# Patient Record
Sex: MALE | Race: WHITE | Employment: OTHER | URBAN - METROPOLITAN AREA
[De-identification: names, ages, dates, MRNs, and addresses within clinical notes are randomized per-mention and may not be internally consistent; named-entity substitution may affect disease eponyms.]

---

## 2019-04-03 ENCOUNTER — HOSPITAL ENCOUNTER (OUTPATIENT)
Dept: WOUND CARE | Age: 19
Discharge: HOME | End: 2019-04-03
Payer: MEDICAID

## 2019-04-03 VITALS
TEMPERATURE: 98.3 F | HEART RATE: 68 BPM | DIASTOLIC BLOOD PRESSURE: 52 MMHG | RESPIRATION RATE: 20 BRPM | SYSTOLIC BLOOD PRESSURE: 104 MMHG

## 2019-04-03 PROCEDURE — 99213 OFFICE O/P EST LOW 20 MIN: CPT

## 2019-04-03 PROCEDURE — 11042 DBRDMT SUBQ TIS 1ST 20SQCM/<: CPT

## 2019-04-03 PROCEDURE — 99204 OFFICE O/P NEW MOD 45 MIN: CPT | Performed by: SURGERY

## 2019-04-03 PROCEDURE — 11042 DBRDMT SUBQ TIS 1ST 20SQCM/<: CPT | Performed by: SURGERY

## 2019-04-03 RX ORDER — METOCLOPRAMIDE HYDROCHLORIDE 5 MG/5ML
5 SOLUTION ORAL 3 TIMES DAILY
COMMUNITY

## 2019-04-03 RX ORDER — RANITIDINE 15 MG/ML
SYRUP ORAL 2 TIMES DAILY
COMMUNITY

## 2019-04-03 RX ORDER — LOPERAMIDE HCL 1 MG/7.5ML
15 SOLUTION ORAL PRN
COMMUNITY

## 2019-04-03 RX ORDER — FLUTICASONE PROPIONATE 50 MCG
1 SPRAY, SUSPENSION (ML) NASAL PRN
COMMUNITY

## 2019-04-03 NOTE — PROGRESS NOTES
H&P / Consultation Note - [address] [place] [address] / Miguel Valladares    PCP : She Cowan DO:     Reason for Consult:  Open wound right hip    HISTORY OF PRESENT ILLNESS:    The patient is a 19 y.o. male who presents for evaluation and treatment at the [address] [place] [address]. The patient has had a wound of right hip. This has been treated with silvadene. Past history significant for bedridden, immobile. The patient reports redness and denies fever, drainage, pain. The suspected etiology of the wound is pressure. The patient has noted that the wound has not been improving. Pt has 1/10 pain associated with the wound. Pt no  hx of steroids. Pt is currently not on abx.       Vascular Studies no    ROS : All others Negative if blank [], Positive if [x]  General Urinary   [] Fevers [] Hematuria   [] Chills [] Dysuria   [] Weight Loss Vascular   Skin [] Claudication   [] Tissue Loss [] Rest Pain   Eyes Neurologic   [] Wears Glasses/Contacts [] Stroke/TIA   [] Vision Changes [] Focal weakness   Respiratory [] Slurred Speech    [] Shortness of breath ENT   Cardiovascular [] Difficulty swallowing   [] Chest Pain    [] Shortness of breath with exertion    Gastrointestinal    [] Abdominal Pain    [] Melena   [] Hematochezia         Past Medical History:   Diagnosis Date    Cerebral palsy, quadriplegic (HCC)      Past Surgical History:   Procedure Laterality Date    BACLOFEN PUMP IMPLANTATION      TENDON RELEASE       Current Medications:     Current Outpatient Medications:     silver sulfADIAZINE (SILVADENE) 1 % cream, Apply 1 Act topically every 72 hours as needed Apply three times a day and as needed to right buttocks, Disp: , Rfl:     metoclopramide (REGLAN) 5 MG/5ML solution, Take 5 mg by mouth 3 times daily, Disp: , Rfl:     ranitidine (ZANTAC) 150 MG/10ML syrup, by Per G Tube route 2 times daily, Disp: , Rfl:     Acetaminophen 160 MG/5ML SYRP, 20 mLs by PEG Tube route every 4 hours as needed for Fever For pain, headache or fever, Disp: , Rfl:     ibuprofen (ADVIL;MOTRIN) 100 MG/5ML suspension, 10 mg/kg by Per G Tube route every 4 hours as needed for Fever, Disp: , Rfl:     Loperamide HCl (CVS LOPERAMIDE HCL) 1 MG/7.5ML LIQD, Take 15 mLs by mouth as needed After second and third loose stool, Disp: , Rfl:     Magnesium Hydroxide (MILK OF MAGNESIA PO), 30 mLs by Per G Tube route as needed If no BM in 3 days, Disp: , Rfl:     bismuth subsalicylate (PEPTO BISMOL) 262 MG/15ML suspension, 15 mLs by Per G Tube route every 4 hours as needed for Indigestion, Disp: , Rfl:     fluticasone (FLONASE) 50 MCG/ACT nasal spray, 1 spray by Each Nare route as needed for Rhinitis, Disp: , Rfl:   Allergies:  Seasonal and Valium [diazepam]  Social History     Socioeconomic History    Marital status: Single     Spouse name: Not on file    Number of children: Not on file    Years of education: Not on file    Highest education level: Not on file   Occupational History    Not on file   Social Needs    Financial resource strain: Not on file    Food insecurity:     Worry: Not on file     Inability: Not on file    Transportation needs:     Medical: Not on file     Non-medical: Not on file   Tobacco Use    Smoking status: Passive Smoke Exposure - Never Smoker   Substance and Sexual Activity    Alcohol use: Not on file    Drug use: Not on file    Sexual activity: Not on file   Lifestyle    Physical activity:     Days per week: Not on file     Minutes per session: Not on file    Stress: Not on file   Relationships    Social connections:     Talks on phone: Not on file     Gets together: Not on file     Attends [employer] service: Not on file     Active member of club or organization: Not on file     Attends meetings of clubs or organizations: Not on file     Relationship status: Not on file    Intimate partner violence:     Fear of current or ex partner: Not on file     Emotionally abused: Not on file     Physically abused: Not on file Forced sexual activity: Not on file   Other Topics Concern    Not on file   Social History Narrative    Not on file     History reviewed. No pertinent family history. Objective:    BP (!) 104/52   Pulse 68   Temp 98.3 °F (36.8 °C) (Oral)   Resp 20   Wound Evaluation  - Undermining is not present  - Fibrinous exudate - Minimal amt  - Hyperkeratotic tissue - Minimal amt  - Granulation tissue - Moderate amt  - Errythema - Minimal amt            Measurements shown are from today's visit. BP (!) 104/52   Pulse 68   Temp 98.3 °F (36.8 °C) (Oral)   Resp 20   CONSTITUTIONAL:   Awake, alert, cooperative  PSYCHIATRIC :  Oriented to time, place and person     Appropriate insight to disease process  EYES: Lids and lashes normal  ENT:  External ears and nose without lesions   Hearing deficits no  NECK: Supple, symmetrical, trachea midline   Thyroid goiter not appreciated   Carotid bruit no  LUNGS:  No increased work of breathing                 Clear to auscultation  CARDIOVASCULAR:  regular rate and rhythm   ABDOMEN:  soft, non-distended, non-tender   Hernias no   Aorta is not palpable  Lymphatics : Cervical lymphadenopathy no     Femoral lymphadenopathy no  SKIN:   Normal skin color   Texture and turgor normal, no induration  EXTREMITIES:   R UE 2/5 strength   No cyanosis noted in nail beds  L UE 2/5 strength   No cyanosis noted in nail beds  R LE Edema no  L LE Edema no  R femoral ++ L femoral ++   R dorsalis pedis ++ L dorsalis pedis ++   R posterior tibial ++ L posterior tibial ++   LABS:    No results found for: WBC, HGB, HCT, PLT, PROTIME, INR, PTT, K, BUN, CREATININE    RADIOLOGY:    Assessment:   Please refer to nursing measurements and assessment regarding wound size pre and post debridement. Wound check.    Care provided includes removal of existing dressing and visual inspection    Procedure:  Debridement: Excisional Debridement  Using curette the wound was sharply debrided    down through and including the removal of subcutaneous tissue. The wound(s) was debrided sharply of fibrotic, necrotic, and hyperkeratotic tissue, including a layer of surrounding healthy tissue. Devitalized Tissue Debrided:  fibrin, slough, necrotic/eschar and exudatee. Percent of Wound Debrided: 100%  Total Surface Area Debrided:   < 20 sq cm  Bleeding: Minimal  Hemostasis:   not needed  Response to treatment:  Well tolerated by patient. Plan:   Plan for wound - Dress per physician order  Treatment:     Compression : no   Dressing : duoderm   Additional : no     1. Labs ordered No  2. Cultures done No  3. Vascular Studies ordered No  4. Pt is not a smoker   - Discussed relationship of smoking and negative affects on wound healing   - Emphasized importance of tobacco avoidace/cessation   - Script for nicotine patch given to patient   - Information regarding support groups for smoking cessation given  5. Discussed appropriate home care of this wound. , Dispensed dressing supplies and instructions on their use., Wound redressed. 6. Patient instructions were given. 7. Follow up: 1 week.     Charles Marshall MD

## 2019-04-10 ENCOUNTER — HOSPITAL ENCOUNTER (OUTPATIENT)
Dept: WOUND CARE | Age: 19
Discharge: HOME | End: 2019-04-10
Payer: MEDICAID

## 2019-04-10 VITALS — DIASTOLIC BLOOD PRESSURE: 60 MMHG | HEART RATE: 64 BPM | SYSTOLIC BLOOD PRESSURE: 110 MMHG | TEMPERATURE: 97.9 F

## 2019-04-10 PROCEDURE — 99213 OFFICE O/P EST LOW 20 MIN: CPT | Performed by: SURGERY

## 2019-04-10 PROCEDURE — 99213 OFFICE O/P EST LOW 20 MIN: CPT

## 2019-07-11 ENCOUNTER — HOSPITAL ENCOUNTER (EMERGENCY)
Age: 19
Discharge: HOME | End: 2019-07-11
Attending: EMERGENCY MEDICINE
Payer: MEDICAID

## 2019-07-11 ENCOUNTER — APPOINTMENT (OUTPATIENT)
Dept: GENERAL RADIOLOGY | Age: 19
End: 2019-07-11
Payer: MEDICAID

## 2019-07-11 VITALS
DIASTOLIC BLOOD PRESSURE: 84 MMHG | SYSTOLIC BLOOD PRESSURE: 124 MMHG | RESPIRATION RATE: 16 BRPM | WEIGHT: 91 LBS | TEMPERATURE: 98.2 F | HEART RATE: 96 BPM | OXYGEN SATURATION: 98 %

## 2019-07-11 DIAGNOSIS — T85.528A GASTROJEJUNOSTOMY TUBE DISLODGEMENT: Primary | ICD-10-CM

## 2019-07-11 PROCEDURE — 2580000003 HC RX 258

## 2019-07-11 PROCEDURE — 74018 RADEX ABDOMEN 1 VIEW: CPT

## 2019-07-11 PROCEDURE — 99283 EMERGENCY DEPT VISIT LOW MDM: CPT

## 2019-07-11 PROCEDURE — 6370000000 HC RX 637 (ALT 250 FOR IP): Performed by: EMERGENCY MEDICINE

## 2019-07-11 RX ADMIN — MAGNESIUM CITRATE 296 ML: 1.75 LIQUID ORAL at 20:11

## 2019-07-11 RX ADMIN — WATER 10 ML: 1 INJECTION INTRAMUSCULAR; INTRAVENOUS; SUBCUTANEOUS at 20:12

## 2019-07-11 SDOH — HEALTH STABILITY: MENTAL HEALTH: HOW OFTEN DO YOU HAVE A DRINK CONTAINING ALCOHOL?: NEVER

## 2019-07-11 NOTE — ED PROVIDER NOTES
reviewed. /84   Pulse 96   Temp 98.2 °F (36.8 °C) (Oral)   Resp 16   Wt 91 lb (41.3 kg)   SpO2 98%   Oxygen Saturation Interpretation: Normal    ---------------------------------------------------PHYSICAL EXAM--------------------------------------  Constitutional/General: Alert, quadriplegic, non toxic in NAD  Head: NC/AT  Eyes: PERRL, EOMI  Mouth: Oropharynx clear, handling secretions, no trismus  Neck: Supple,   Pulmonary: Lungs clear to auscultation bilaterally, no wheezes, rales, or rhonchi. Not in respiratory distress  Cardiovascular:  Regular rate and rhythm, no murmurs, gallops, or rubs. 2+ distal pulses. Abdomen: Soft, non tender, G-tube site is present with G-tube dislodged. Skin: warm and dry   Neurologic: Alert, quadriplegic   Psych: Normal Affect      ------------------------------ ED COURSE/MEDICAL DECISION MAKING----------------------  Medications   sterile water injection (10 mLs  Given 7/11/19 2012)   magnesium citrate solution 296 mL (296 mLs Oral Given 7/11/19 2011)       Medical Decision Making:    Pt is a 20 y/o male who presents for G-tube replacement where it was dislodged today but neither is guardian or pt aware of how. Family brought a replacement tube which was replaced by me. Mag citrate was ordered due to family believing pt may be constipated and Guardian gave magnesium citrate per g tube gravity without difficulty. No bm while here. XR abdomen ordered to rule out constipation. XR shows there is no evidence of obstructive dilation, perforation, or pathologic fluid levels. Family would like pt to be d/c home. Pt to be d/c and follow up with his PCP. Patient was explicitly instructed on specific signs and symptoms on which to return to the emergency room for. Patient was instructed to return to the ER for any new or worsening symptoms. Additional discharge instructions were given verbally. All questions were answered.  Patient is comfortable and agreeable with discharge

## 2019-09-02 ENCOUNTER — HOSPITAL ENCOUNTER (EMERGENCY)
Age: 19
Discharge: HOME | End: 2019-09-02
Attending: EMERGENCY MEDICINE
Payer: MEDICAID

## 2019-09-02 VITALS
RESPIRATION RATE: 16 BRPM | HEART RATE: 113 BPM | WEIGHT: 98.3 LBS | TEMPERATURE: 98 F | DIASTOLIC BLOOD PRESSURE: 74 MMHG | OXYGEN SATURATION: 95 % | SYSTOLIC BLOOD PRESSURE: 117 MMHG | HEIGHT: 60 IN | BODY MASS INDEX: 19.3 KG/M2

## 2019-09-02 DIAGNOSIS — L03.115 CELLULITIS OF RIGHT LOWER EXTREMITY: Primary | ICD-10-CM

## 2019-09-02 LAB
ANION GAP SERPL CALCULATED.3IONS-SCNC: 12 MMOL/L (ref 7–16)
BASOPHILS ABSOLUTE: 0.04 E9/L (ref 0–0.2)
BASOPHILS RELATIVE PERCENT: 0.4 % (ref 0–2)
BUN BLDV-MCNC: 12 MG/DL (ref 6–20)
CALCIUM SERPL-MCNC: 9.5 MG/DL (ref 8.6–10.2)
CHLORIDE BLD-SCNC: 99 MMOL/L (ref 98–107)
CO2: 26 MMOL/L (ref 22–29)
CREAT SERPL-MCNC: 0.4 MG/DL (ref 0.7–1.2)
EOSINOPHILS ABSOLUTE: 0.08 E9/L (ref 0.05–0.5)
EOSINOPHILS RELATIVE PERCENT: 0.8 % (ref 0–6)
GFR AFRICAN AMERICAN: >60
GFR NON-AFRICAN AMERICAN: >60 ML/MIN/1.73
GLUCOSE BLD-MCNC: 73 MG/DL (ref 74–99)
HCT VFR BLD CALC: 43.5 % (ref 37–54)
HEMOGLOBIN: 14.5 G/DL (ref 12.5–16.5)
IMMATURE GRANULOCYTES #: 0.04 E9/L
IMMATURE GRANULOCYTES %: 0.4 % (ref 0–5)
LYMPHOCYTES ABSOLUTE: 2.35 E9/L (ref 1.5–4)
LYMPHOCYTES RELATIVE PERCENT: 22.1 % (ref 20–42)
MCH RBC QN AUTO: 32.2 PG (ref 26–35)
MCHC RBC AUTO-ENTMCNC: 33.3 % (ref 32–34.5)
MCV RBC AUTO: 96.5 FL (ref 80–99.9)
MONOCYTES ABSOLUTE: 1.01 E9/L (ref 0.1–0.95)
MONOCYTES RELATIVE PERCENT: 9.5 % (ref 2–12)
NEUTROPHILS ABSOLUTE: 7.13 E9/L (ref 1.8–7.3)
NEUTROPHILS RELATIVE PERCENT: 66.8 % (ref 43–80)
PDW BLD-RTO: 12.1 FL (ref 11.5–15)
PLATELET # BLD: 203 E9/L (ref 130–450)
PMV BLD AUTO: 10.5 FL (ref 7–12)
POTASSIUM REFLEX MAGNESIUM: 3.9 MMOL/L (ref 3.5–5)
RBC # BLD: 4.51 E12/L (ref 3.8–5.8)
SODIUM BLD-SCNC: 137 MMOL/L (ref 132–146)
WBC # BLD: 10.7 E9/L (ref 4.5–11.5)

## 2019-09-02 PROCEDURE — 36415 COLL VENOUS BLD VENIPUNCTURE: CPT

## 2019-09-02 PROCEDURE — 87040 BLOOD CULTURE FOR BACTERIA: CPT

## 2019-09-02 PROCEDURE — 80048 BASIC METABOLIC PNL TOTAL CA: CPT

## 2019-09-02 PROCEDURE — 6370000000 HC RX 637 (ALT 250 FOR IP): Performed by: EMERGENCY MEDICINE

## 2019-09-02 PROCEDURE — 2580000003 HC RX 258: Performed by: EMERGENCY MEDICINE

## 2019-09-02 PROCEDURE — 85025 COMPLETE CBC W/AUTO DIFF WBC: CPT

## 2019-09-02 PROCEDURE — 99283 EMERGENCY DEPT VISIT LOW MDM: CPT

## 2019-09-02 RX ORDER — CEPHALEXIN 500 MG/1
500 CAPSULE ORAL ONCE
Status: COMPLETED | OUTPATIENT
Start: 2019-09-02 | End: 2019-09-02

## 2019-09-02 RX ORDER — POLYETHYLENE GLYCOL 3350 17 G/17G
17 POWDER, FOR SOLUTION ORAL EVERY OTHER DAY
COMMUNITY
Start: 2018-10-30 | End: 2019-10-03 | Stop reason: ALTCHOICE

## 2019-09-02 RX ORDER — CEPHALEXIN 500 MG/1
500 CAPSULE ORAL 4 TIMES DAILY
Qty: 40 CAPSULE | Refills: 0 | Status: SHIPPED | OUTPATIENT
Start: 2019-09-03 | End: 2019-09-13

## 2019-09-02 RX ORDER — DOXYCYCLINE HYCLATE 100 MG/1
100 CAPSULE ORAL ONCE
Status: COMPLETED | OUTPATIENT
Start: 2019-09-02 | End: 2019-09-02

## 2019-09-02 RX ORDER — BROMPHENIRAMINE MALEATE, PSEUDOEPHEDRINE HYDROCHLORIDE, AND DEXTROMETHORPHAN HYDROBROMIDE 2; 30; 10 MG/5ML; MG/5ML; MG/5ML
5-10 SYRUP ORAL EVERY 6 HOURS PRN
COMMUNITY
Start: 2012-12-21 | End: 2019-11-15 | Stop reason: ALTCHOICE

## 2019-09-02 RX ORDER — SODIUM CHLORIDE 0.9 % (FLUSH) 0.9 %
10 SYRINGE (ML) INJECTION PRN
Status: DISCONTINUED | OUTPATIENT
Start: 2019-09-02 | End: 2019-09-03 | Stop reason: HOSPADM

## 2019-09-02 RX ORDER — 0.9 % SODIUM CHLORIDE 0.9 %
1000 INTRAVENOUS SOLUTION INTRAVENOUS ONCE
Status: COMPLETED | OUTPATIENT
Start: 2019-09-02 | End: 2019-09-02

## 2019-09-02 RX ORDER — DOXYCYCLINE HYCLATE 100 MG
100 TABLET ORAL 2 TIMES DAILY
Qty: 20 TABLET | Refills: 0 | Status: SHIPPED | OUTPATIENT
Start: 2019-09-03 | End: 2019-09-13

## 2019-09-02 RX ADMIN — DOXYCYCLINE HYCLATE 100 MG: 100 CAPSULE ORAL at 21:29

## 2019-09-02 RX ADMIN — SODIUM CHLORIDE 1000 ML: 9 INJECTION, SOLUTION INTRAVENOUS at 21:28

## 2019-09-02 RX ADMIN — CEPHALEXIN 500 MG: 500 CAPSULE ORAL at 21:29

## 2019-09-02 ASSESSMENT — ENCOUNTER SYMPTOMS
SORE THROAT: 0
COLOR CHANGE: 1
ABDOMINAL PAIN: 0
BACK PAIN: 0
DIARRHEA: 0
RHINORRHEA: 0
EYE PAIN: 0
VOMITING: 0
COUGH: 0
SHORTNESS OF BREATH: 0
BLOOD IN STOOL: 0
CHEST TIGHTNESS: 0
NAUSEA: 0

## 2019-09-02 NOTE — ED PROVIDER NOTES
and breath sounds normal. No accessory muscle usage. No respiratory distress. He has no wheezes. He has no rhonchi. He has no rales. Abdominal: Soft. Normal appearance and bowel sounds are normal. He exhibits no distension. There is no tenderness. Musculoskeletal: Normal range of motion. He exhibits no edema. Lymphadenopathy:     He has no cervical adenopathy. Neurological: He is alert. Chronic contractures noted. Skin: Skin is warm and dry. No rash noted. He is not diaphoretic. There is erythema. No pallor. Maculopapular rash approximately 3 cm anterior shin and 6 cm posterior calf of right lower extremity, no drainage. Is warm, indurated posteriorly without fluctuance. No pain out of proportion. Nursing note and vitals reviewed. Right posterior lower leg     Right anterior lower leg          Procedures         --------------------------------------------- PAST HISTORY ---------------------------------------------  Past Medical History:  has a past medical history of Cerebral palsy, quadriplegic ([facility] [address] Past Surgical History:  has a past surgical history that includes baclofen pump implantation and tendon release. Social History:  reports that he is a non-smoker but has been exposed to tobacco smoke. He has never used smokeless tobacco. He reports that he does not drink alcohol or use drugs. Family History: family history is not on file. The patients home medications have been reviewed.     Allergies: Seasonal and Valium [diazepam]    -------------------------------------------------- RESULTS -------------------------------------------------    Lab  Results for orders placed or performed during the hospital encounter of 09/02/19   CBC Auto Differential   Result Value Ref Range    WBC 10.7 4.5 - 11.5 E9/L    RBC 4.51 3.80 - 5.80 E12/L    Hemoglobin 14.5 12.5 - 16.5 g/dL    Hematocrit 43.5 37.0 - 54.0 %    MCV 96.5 80.0 - 99.9 fL    MCH 32.2 26.0 - 35.0 pg    MCHC 33.3 32.0 - 34.5 % RDW 12.1 11.5 - 15.0 fL    Platelets 824 001 - 723 E9/L    MPV 10.5 7.0 - 12.0 fL    Neutrophils % 66.8 43.0 - 80.0 %    Immature Granulocytes % 0.4 0.0 - 5.0 %    Lymphocytes % 22.1 20.0 - 42.0 %    Monocytes % 9.5 2.0 - 12.0 %    Eosinophils % 0.8 0.0 - 6.0 %    Basophils % 0.4 0.0 - 2.0 %    Neutrophils Absolute 7.13 1.80 - 7.30 E9/L    Immature Granulocytes # 0.04 E9/L    Lymphocytes Absolute 2.35 1.50 - 4.00 E9/L    Monocytes Absolute 1.01 (H) 0.10 - 0.95 E9/L    Eosinophils Absolute 0.08 0.05 - 0.50 E9/L    Basophils Absolute 0.04 0.00 - 0.20 X0/I   Basic Metabolic Panel w/ Reflex to MG   Result Value Ref Range    Sodium 137 132 - 146 mmol/L    Potassium reflex Magnesium 3.9 3.5 - 5.0 mmol/L    Chloride 99 98 - 107 mmol/L    CO2 26 22 - 29 mmol/L    Anion Gap 12 7 - 16 mmol/L    Glucose 73 (L) 74 - 99 mg/dL    BUN 12 6 - 20 mg/dL    CREATININE 0.4 (L) 0.7 - 1.2 mg/dL    GFR Non-African American >60 >=60 mL/min/1.73    GFR African American >60     Calcium 9.5 8.6 - 10.2 mg/dL       Radiology  No orders to display     ------------------------- NURSING NOTES AND VITALS REVIEWED ---------------------------  Date / Time Roomed:  9/2/2019  7:31 PM  ED Bed Assignment:  18/18    The nursing notes within the ED encounter and vital signs as below have been reviewed. Patient Vitals for the past 24 hrs:   BP Temp Temp src Pulse Resp SpO2 Height Weight   09/02/19 2115 114/76 -- -- 97 -- 95 % -- --   09/02/19 2100 110/78 -- -- 99 -- 96 % -- --   09/02/19 2018 -- -- -- 107 -- -- -- --   09/02/19 2016 104/75 99.6 °F (37.6 °C) Axillary 109 20 97 % 4' 4\" (1.321 m) 98 lb 4.8 oz (44.6 kg)   09/02/19 1932 106/66 100 °F (37.8 °C) Axillary 112 20 98 % -- --       Oxygen Saturation Interpretation: Normal    ------------------------------------------ PROGRESS NOTES ------------------------------------------  Re-evaluation(s):  10:20 PM: Given doses of Keflex, doxycycline.   Discussed results and plan with patient and caretaker who is

## 2019-09-08 LAB
BLOOD CULTURE, ROUTINE: NORMAL
CULTURE, BLOOD 2: NORMAL

## 2019-10-03 ENCOUNTER — APPOINTMENT (OUTPATIENT)
Dept: GENERAL RADIOLOGY | Age: 19
End: 2019-10-03
Payer: MEDICAID

## 2019-10-03 ENCOUNTER — APPOINTMENT (OUTPATIENT)
Dept: CT IMAGING | Age: 19
End: 2019-10-03
Payer: MEDICAID

## 2019-10-03 ENCOUNTER — HOSPITAL ENCOUNTER (EMERGENCY)
Age: 19
Discharge: HOME | End: 2019-10-03
Attending: FAMILY MEDICINE
Payer: MEDICAID

## 2019-10-03 VITALS
RESPIRATION RATE: 16 BRPM | DIASTOLIC BLOOD PRESSURE: 74 MMHG | WEIGHT: 95 LBS | BODY MASS INDEX: 24.7 KG/M2 | HEART RATE: 86 BPM | SYSTOLIC BLOOD PRESSURE: 118 MMHG | OXYGEN SATURATION: 99 % | TEMPERATURE: 98.2 F

## 2019-10-03 DIAGNOSIS — K56.41 FECAL IMPACTION (HCC): Primary | ICD-10-CM

## 2019-10-03 PROCEDURE — 74176 CT ABD & PELVIS W/O CONTRAST: CPT

## 2019-10-03 PROCEDURE — 6370000000 HC RX 637 (ALT 250 FOR IP): Performed by: FAMILY MEDICINE

## 2019-10-03 PROCEDURE — 71046 X-RAY EXAM CHEST 2 VIEWS: CPT

## 2019-10-03 PROCEDURE — 99283 EMERGENCY DEPT VISIT LOW MDM: CPT

## 2019-10-03 RX ADMIN — MAGNESIUM CITRATE 296 ML: 1.75 LIQUID ORAL at 17:06

## 2019-10-03 ASSESSMENT — PAIN DESCRIPTION - LOCATION: LOCATION: ABDOMEN

## 2019-10-22 ENCOUNTER — HOSPITAL ENCOUNTER (EMERGENCY)
Age: 19
Discharge: HOME | End: 2019-10-22
Attending: EMERGENCY MEDICINE
Payer: MEDICAID

## 2019-10-22 ENCOUNTER — APPOINTMENT (OUTPATIENT)
Dept: CT IMAGING | Age: 19
End: 2019-10-22
Payer: MEDICAID

## 2019-10-22 VITALS
DIASTOLIC BLOOD PRESSURE: 74 MMHG | TEMPERATURE: 98.5 F | OXYGEN SATURATION: 93 % | RESPIRATION RATE: 16 BRPM | SYSTOLIC BLOOD PRESSURE: 124 MMHG | HEART RATE: 121 BPM

## 2019-10-22 DIAGNOSIS — K29.00 OTHER ACUTE GASTRITIS, PRESENCE OF BLEEDING UNSPECIFIED: Primary | ICD-10-CM

## 2019-10-22 DIAGNOSIS — K62.89 PROCTITIS: ICD-10-CM

## 2019-10-22 LAB
ANION GAP SERPL CALCULATED.3IONS-SCNC: 12 MMOL/L (ref 7–16)
BASOPHILS ABSOLUTE: 0.03 E9/L (ref 0–0.2)
BASOPHILS RELATIVE PERCENT: 0.4 % (ref 0–2)
BUN BLDV-MCNC: 10 MG/DL (ref 6–20)
CALCIUM SERPL-MCNC: 10.2 MG/DL (ref 8.6–10.2)
CHLORIDE BLD-SCNC: 100 MMOL/L (ref 98–107)
CO2: 28 MMOL/L (ref 22–29)
CREAT SERPL-MCNC: 0.3 MG/DL (ref 0.7–1.2)
EOSINOPHILS ABSOLUTE: 0.05 E9/L (ref 0.05–0.5)
EOSINOPHILS RELATIVE PERCENT: 0.7 % (ref 0–6)
GFR AFRICAN AMERICAN: >60
GFR NON-AFRICAN AMERICAN: >60 ML/MIN/1.73
GLUCOSE BLD-MCNC: 84 MG/DL (ref 74–99)
HCT VFR BLD CALC: 46.9 % (ref 37–54)
HEMOGLOBIN: 16.1 G/DL (ref 12.5–16.5)
IMMATURE GRANULOCYTES #: 0.07 E9/L
IMMATURE GRANULOCYTES %: 0.9 % (ref 0–5)
LYMPHOCYTES ABSOLUTE: 2.22 E9/L (ref 1.5–4)
LYMPHOCYTES RELATIVE PERCENT: 29.1 % (ref 20–42)
MCH RBC QN AUTO: 32.2 PG (ref 26–35)
MCHC RBC AUTO-ENTMCNC: 34.3 % (ref 32–34.5)
MCV RBC AUTO: 93.8 FL (ref 80–99.9)
MONOCYTES ABSOLUTE: 0.61 E9/L (ref 0.1–0.95)
MONOCYTES RELATIVE PERCENT: 8 % (ref 2–12)
NEUTROPHILS ABSOLUTE: 4.66 E9/L (ref 1.8–7.3)
NEUTROPHILS RELATIVE PERCENT: 60.9 % (ref 43–80)
PDW BLD-RTO: 11.7 FL (ref 11.5–15)
PLATELET # BLD: 219 E9/L (ref 130–450)
PMV BLD AUTO: 10.3 FL (ref 7–12)
POTASSIUM REFLEX MAGNESIUM: 4.3 MMOL/L (ref 3.5–5)
RBC # BLD: 5 E12/L (ref 3.8–5.8)
SODIUM BLD-SCNC: 140 MMOL/L (ref 132–146)
WBC # BLD: 7.6 E9/L (ref 4.5–11.5)

## 2019-10-22 PROCEDURE — 99284 EMERGENCY DEPT VISIT MOD MDM: CPT

## 2019-10-22 PROCEDURE — 2580000003 HC RX 258: Performed by: GENERAL PRACTICE

## 2019-10-22 PROCEDURE — 36415 COLL VENOUS BLD VENIPUNCTURE: CPT

## 2019-10-22 PROCEDURE — 6360000004 HC RX CONTRAST MEDICATION: Performed by: RADIOLOGY

## 2019-10-22 PROCEDURE — 80048 BASIC METABOLIC PNL TOTAL CA: CPT

## 2019-10-22 PROCEDURE — 85025 COMPLETE CBC W/AUTO DIFF WBC: CPT

## 2019-10-22 PROCEDURE — 74177 CT ABD & PELVIS W/CONTRAST: CPT

## 2019-10-22 PROCEDURE — 6360000002 HC RX W HCPCS: Performed by: GENERAL PRACTICE

## 2019-10-22 PROCEDURE — C9113 INJ PANTOPRAZOLE SODIUM, VIA: HCPCS | Performed by: GENERAL PRACTICE

## 2019-10-22 RX ORDER — PANTOPRAZOLE SODIUM 40 MG/10ML
40 INJECTION, POWDER, LYOPHILIZED, FOR SOLUTION INTRAVENOUS DAILY
Status: DISCONTINUED | OUTPATIENT
Start: 2019-10-22 | End: 2019-10-23 | Stop reason: HOSPADM

## 2019-10-22 RX ORDER — 0.9 % SODIUM CHLORIDE 0.9 %
500 INTRAVENOUS SOLUTION INTRAVENOUS ONCE
Status: COMPLETED | OUTPATIENT
Start: 2019-10-22 | End: 2019-10-22

## 2019-10-22 RX ORDER — 0.9 % SODIUM CHLORIDE 0.9 %
10 VIAL (ML) INJECTION DAILY
Status: DISCONTINUED | OUTPATIENT
Start: 2019-10-22 | End: 2019-10-23 | Stop reason: HOSPADM

## 2019-10-22 RX ORDER — CEFDINIR 250 MG/5ML
300 POWDER, FOR SUSPENSION ORAL 2 TIMES DAILY
Qty: 120 ML | Refills: 0 | Status: SHIPPED | OUTPATIENT
Start: 2019-10-22 | End: 2019-11-01

## 2019-10-22 RX ADMIN — PANTOPRAZOLE SODIUM 40 MG: 40 INJECTION, POWDER, FOR SOLUTION INTRAVENOUS at 18:09

## 2019-10-22 RX ADMIN — IOPAMIDOL 110 ML: 755 INJECTION, SOLUTION INTRAVENOUS at 18:45

## 2019-10-22 RX ADMIN — Medication 10 ML: at 18:10

## 2019-10-22 RX ADMIN — SODIUM CHLORIDE 500 ML: 9 INJECTION, SOLUTION INTRAVENOUS at 18:09

## 2019-10-22 ASSESSMENT — ENCOUNTER SYMPTOMS
DIARRHEA: 0
VOMITING: 1
ABDOMINAL DISTENTION: 1
SHORTNESS OF BREATH: 0
WHEEZING: 0
NAUSEA: 1
ABDOMINAL PAIN: 1

## 2019-11-15 ENCOUNTER — HOSPITAL ENCOUNTER (EMERGENCY)
Age: 19
Discharge: HOME | End: 2019-11-15
Payer: MEDICAID

## 2019-11-15 ENCOUNTER — APPOINTMENT (OUTPATIENT)
Dept: GENERAL RADIOLOGY | Age: 19
End: 2019-11-15
Payer: MEDICAID

## 2019-11-15 VITALS
RESPIRATION RATE: 16 BRPM | DIASTOLIC BLOOD PRESSURE: 78 MMHG | OXYGEN SATURATION: 96 % | BODY MASS INDEX: 18.65 KG/M2 | WEIGHT: 95 LBS | SYSTOLIC BLOOD PRESSURE: 112 MMHG | HEIGHT: 60 IN | TEMPERATURE: 99.2 F | HEART RATE: 112 BPM

## 2019-11-15 DIAGNOSIS — R11.10 NON-INTRACTABLE VOMITING, PRESENCE OF NAUSEA NOT SPECIFIED, UNSPECIFIED VOMITING TYPE: ICD-10-CM

## 2019-11-15 DIAGNOSIS — J06.9 VIRAL URI WITH COUGH: Primary | ICD-10-CM

## 2019-11-15 LAB
ALBUMIN SERPL-MCNC: 5 G/DL (ref 3.5–5.2)
ALP BLD-CCNC: 61 U/L (ref 40–129)
ALT SERPL-CCNC: 27 U/L (ref 0–40)
ANION GAP SERPL CALCULATED.3IONS-SCNC: 11 MMOL/L (ref 7–16)
AST SERPL-CCNC: 19 U/L (ref 0–39)
BASOPHILS ABSOLUTE: 0.03 E9/L (ref 0–0.2)
BASOPHILS RELATIVE PERCENT: 0.6 % (ref 0–2)
BILIRUB SERPL-MCNC: 0.3 MG/DL (ref 0–1.2)
BUN BLDV-MCNC: 11 MG/DL (ref 6–20)
CALCIUM SERPL-MCNC: 10 MG/DL (ref 8.6–10.2)
CHLORIDE BLD-SCNC: 103 MMOL/L (ref 98–107)
CO2: 25 MMOL/L (ref 22–29)
CREAT SERPL-MCNC: 0.3 MG/DL (ref 0.7–1.2)
EOSINOPHILS ABSOLUTE: 0.04 E9/L (ref 0.05–0.5)
EOSINOPHILS RELATIVE PERCENT: 0.8 % (ref 0–6)
GFR AFRICAN AMERICAN: >60
GFR NON-AFRICAN AMERICAN: >60 ML/MIN/1.73
GLUCOSE BLD-MCNC: 88 MG/DL (ref 74–99)
HCT VFR BLD CALC: 48.8 % (ref 37–54)
HEMOGLOBIN: 16.2 G/DL (ref 12.5–16.5)
IMMATURE GRANULOCYTES #: 0.04 E9/L
IMMATURE GRANULOCYTES %: 0.8 % (ref 0–5)
LACTIC ACID: 1.7 MMOL/L (ref 0.5–2.2)
LYMPHOCYTES ABSOLUTE: 1.15 E9/L (ref 1.5–4)
LYMPHOCYTES RELATIVE PERCENT: 22.3 % (ref 20–42)
MCH RBC QN AUTO: 32.3 PG (ref 26–35)
MCHC RBC AUTO-ENTMCNC: 33.2 % (ref 32–34.5)
MCV RBC AUTO: 97.2 FL (ref 80–99.9)
MONOCYTES ABSOLUTE: 0.45 E9/L (ref 0.1–0.95)
MONOCYTES RELATIVE PERCENT: 8.7 % (ref 2–12)
NEUTROPHILS ABSOLUTE: 3.44 E9/L (ref 1.8–7.3)
NEUTROPHILS RELATIVE PERCENT: 66.8 % (ref 43–80)
PDW BLD-RTO: 12.1 FL (ref 11.5–15)
PLATELET # BLD: 187 E9/L (ref 130–450)
PMV BLD AUTO: 10.9 FL (ref 7–12)
POTASSIUM REFLEX MAGNESIUM: 4.1 MMOL/L (ref 3.5–5)
RBC # BLD: 5.02 E12/L (ref 3.8–5.8)
SODIUM BLD-SCNC: 139 MMOL/L (ref 132–146)
TOTAL PROTEIN: 7.9 G/DL (ref 6.4–8.3)
WBC # BLD: 5.2 E9/L (ref 4.5–11.5)

## 2019-11-15 PROCEDURE — 83605 ASSAY OF LACTIC ACID: CPT

## 2019-11-15 PROCEDURE — 80053 COMPREHEN METABOLIC PANEL: CPT

## 2019-11-15 PROCEDURE — 71045 X-RAY EXAM CHEST 1 VIEW: CPT

## 2019-11-15 PROCEDURE — 99282 EMERGENCY DEPT VISIT SF MDM: CPT

## 2019-11-15 PROCEDURE — 85025 COMPLETE CBC W/AUTO DIFF WBC: CPT

## 2019-11-15 RX ORDER — DEXTROMETHORPHAN HYDROBROMIDE AND PROMETHAZINE HYDROCHLORIDE 15; 6.25 MG/5ML; MG/5ML
SYRUP ORAL
Qty: 240 ML | Refills: 1 | Status: SHIPPED | OUTPATIENT
Start: 2019-11-15

## 2019-12-10 ENCOUNTER — APPOINTMENT (OUTPATIENT)
Dept: GENERAL RADIOLOGY | Age: 19
End: 2019-12-10
Payer: MEDICAID

## 2019-12-10 ENCOUNTER — HOSPITAL ENCOUNTER (EMERGENCY)
Age: 19
Discharge: HOME | End: 2019-12-11
Attending: EMERGENCY MEDICINE
Payer: MEDICAID

## 2019-12-10 DIAGNOSIS — D72.829 LEUKOCYTOSIS, UNSPECIFIED TYPE: ICD-10-CM

## 2019-12-10 DIAGNOSIS — R00.0 TACHYCARDIA: ICD-10-CM

## 2019-12-10 DIAGNOSIS — J18.9 PNEUMONIA DUE TO ORGANISM: ICD-10-CM

## 2019-12-10 DIAGNOSIS — L01.00 IMPETIGO: ICD-10-CM

## 2019-12-10 DIAGNOSIS — R21 RASH AND OTHER NONSPECIFIC SKIN ERUPTION: ICD-10-CM

## 2019-12-10 DIAGNOSIS — R50.9 FEVER, UNSPECIFIED FEVER CAUSE: ICD-10-CM

## 2019-12-10 DIAGNOSIS — J22 LOWER RESPIRATORY INFECTION: Primary | ICD-10-CM

## 2019-12-10 PROCEDURE — 81001 URINALYSIS AUTO W/SCOPE: CPT

## 2019-12-10 PROCEDURE — 87502 INFLUENZA DNA AMP PROBE: CPT

## 2019-12-10 PROCEDURE — 87088 URINE BACTERIA CULTURE: CPT

## 2019-12-10 PROCEDURE — 85025 COMPLETE CBC W/AUTO DIFF WBC: CPT

## 2019-12-10 PROCEDURE — 80053 COMPREHEN METABOLIC PANEL: CPT

## 2019-12-10 PROCEDURE — 83605 ASSAY OF LACTIC ACID: CPT

## 2019-12-10 PROCEDURE — 87040 BLOOD CULTURE FOR BACTERIA: CPT

## 2019-12-10 PROCEDURE — 99284 EMERGENCY DEPT VISIT MOD MDM: CPT

## 2019-12-10 PROCEDURE — 71045 X-RAY EXAM CHEST 1 VIEW: CPT

## 2019-12-10 RX ORDER — 0.9 % SODIUM CHLORIDE 0.9 %
1500 INTRAVENOUS SOLUTION INTRAVENOUS ONCE
Status: COMPLETED | OUTPATIENT
Start: 2019-12-10 | End: 2019-12-11

## 2019-12-10 RX ORDER — ONDANSETRON 2 MG/ML
4 INJECTION INTRAMUSCULAR; INTRAVENOUS ONCE
Status: DISCONTINUED | OUTPATIENT
Start: 2019-12-10 | End: 2019-12-10

## 2019-12-10 ASSESSMENT — ENCOUNTER SYMPTOMS
ABDOMINAL PAIN: 0
WHEEZING: 0
SHORTNESS OF BREATH: 0
SINUS CONGESTION: 1
COUGH: 1
RHINORRHEA: 0

## 2019-12-10 ASSESSMENT — PAIN DESCRIPTION - LOCATION: LOCATION: HAND

## 2019-12-10 ASSESSMENT — PAIN DESCRIPTION - ORIENTATION: ORIENTATION: LEFT

## 2019-12-11 VITALS
BODY MASS INDEX: 18.78 KG/M2 | HEIGHT: 64 IN | WEIGHT: 110 LBS | SYSTOLIC BLOOD PRESSURE: 110 MMHG | TEMPERATURE: 97.6 F | RESPIRATION RATE: 20 BRPM | OXYGEN SATURATION: 96 % | HEART RATE: 113 BPM | DIASTOLIC BLOOD PRESSURE: 75 MMHG

## 2019-12-11 LAB
ALBUMIN SERPL-MCNC: 4.7 G/DL (ref 3.5–5.2)
ALP BLD-CCNC: 59 U/L (ref 40–129)
ALT SERPL-CCNC: 20 U/L (ref 0–40)
ANION GAP SERPL CALCULATED.3IONS-SCNC: 11 MMOL/L (ref 7–16)
AST SERPL-CCNC: 12 U/L (ref 0–39)
BACTERIA: NORMAL /HPF
BASOPHILS ABSOLUTE: 0.03 E9/L (ref 0–0.2)
BASOPHILS RELATIVE PERCENT: 0.2 % (ref 0–2)
BILIRUB SERPL-MCNC: 0.4 MG/DL (ref 0–1.2)
BILIRUBIN URINE: NEGATIVE
BLOOD, URINE: NEGATIVE
BUN BLDV-MCNC: 8 MG/DL (ref 6–20)
CALCIUM SERPL-MCNC: 9.4 MG/DL (ref 8.6–10.2)
CHLORIDE BLD-SCNC: 98 MMOL/L (ref 98–107)
CLARITY: CLEAR
CO2: 25 MMOL/L (ref 22–29)
COLOR: YELLOW
CREAT SERPL-MCNC: 0.3 MG/DL (ref 0.7–1.2)
EKG ATRIAL RATE: 116 BPM
EKG P AXIS: 46 DEGREES
EKG P-R INTERVAL: 130 MS
EKG Q-T INTERVAL: 312 MS
EKG QRS DURATION: 84 MS
EKG QTC CALCULATION (BAZETT): 433 MS
EKG R AXIS: 157 DEGREES
EKG T AXIS: 2 DEGREES
EKG VENTRICULAR RATE: 116 BPM
EOSINOPHILS ABSOLUTE: 0.02 E9/L (ref 0.05–0.5)
EOSINOPHILS RELATIVE PERCENT: 0.1 % (ref 0–6)
EPITHELIAL CELLS, UA: NORMAL /HPF
GFR AFRICAN AMERICAN: >60
GFR NON-AFRICAN AMERICAN: >60 ML/MIN/1.73
GLUCOSE BLD-MCNC: 102 MG/DL (ref 74–99)
GLUCOSE URINE: NEGATIVE MG/DL
HCT VFR BLD CALC: 44.8 % (ref 37–54)
HEMOGLOBIN: 15.2 G/DL (ref 12.5–16.5)
IMMATURE GRANULOCYTES #: 0.05 E9/L
IMMATURE GRANULOCYTES %: 0.3 % (ref 0–5)
INFLUENZA A BY PCR: NOT DETECTED
INFLUENZA B BY PCR: NOT DETECTED
KETONES, URINE: NEGATIVE MG/DL
LACTIC ACID: 0.8 MMOL/L (ref 0.5–2.2)
LEUKOCYTE ESTERASE, URINE: NEGATIVE
LYMPHOCYTES ABSOLUTE: 1.02 E9/L (ref 1.5–4)
LYMPHOCYTES RELATIVE PERCENT: 7.1 % (ref 20–42)
MCH RBC QN AUTO: 31.9 PG (ref 26–35)
MCHC RBC AUTO-ENTMCNC: 33.9 % (ref 32–34.5)
MCV RBC AUTO: 93.9 FL (ref 80–99.9)
MONOCYTES ABSOLUTE: 1.15 E9/L (ref 0.1–0.95)
MONOCYTES RELATIVE PERCENT: 8 % (ref 2–12)
NEUTROPHILS ABSOLUTE: 12.14 E9/L (ref 1.8–7.3)
NEUTROPHILS RELATIVE PERCENT: 84.3 % (ref 43–80)
NITRITE, URINE: NEGATIVE
PDW BLD-RTO: 12.3 FL (ref 11.5–15)
PH UA: 7.5 (ref 5–9)
PLATELET # BLD: 202 E9/L (ref 130–450)
PMV BLD AUTO: 10.5 FL (ref 7–12)
POTASSIUM SERPL-SCNC: 3.8 MMOL/L (ref 3.5–5)
PROTEIN UA: NEGATIVE MG/DL
RBC # BLD: 4.77 E12/L (ref 3.8–5.8)
RBC UA: NORMAL /HPF (ref 0–2)
SODIUM BLD-SCNC: 134 MMOL/L (ref 132–146)
SPECIFIC GRAVITY UA: <=1.005 (ref 1–1.03)
TOTAL PROTEIN: 7.8 G/DL (ref 6.4–8.3)
UROBILINOGEN, URINE: 0.2 E.U./DL
WBC # BLD: 14.4 E9/L (ref 4.5–11.5)
WBC UA: NORMAL /HPF (ref 0–5)

## 2019-12-11 PROCEDURE — 93005 ELECTROCARDIOGRAM TRACING: CPT | Performed by: STUDENT IN AN ORGANIZED HEALTH CARE EDUCATION/TRAINING PROGRAM

## 2019-12-11 PROCEDURE — 96365 THER/PROPH/DIAG IV INF INIT: CPT

## 2019-12-11 PROCEDURE — 2580000003 HC RX 258: Performed by: STUDENT IN AN ORGANIZED HEALTH CARE EDUCATION/TRAINING PROGRAM

## 2019-12-11 PROCEDURE — 6370000000 HC RX 637 (ALT 250 FOR IP)

## 2019-12-11 PROCEDURE — 6360000002 HC RX W HCPCS: Performed by: STUDENT IN AN ORGANIZED HEALTH CARE EDUCATION/TRAINING PROGRAM

## 2019-12-11 RX ORDER — AZITHROMYCIN 250 MG/1
TABLET, FILM COATED ORAL
Qty: 1 PACKET | Refills: 0 | Status: SHIPPED | OUTPATIENT
Start: 2019-12-11 | End: 2019-12-15

## 2019-12-11 RX ORDER — MUPIROCIN CALCIUM 20 MG/G
CREAM TOPICAL
Qty: 1 TUBE | Refills: 0 | Status: SHIPPED | OUTPATIENT
Start: 2019-12-11 | End: 2020-01-10

## 2019-12-11 RX ORDER — ACETAMINOPHEN 160 MG/5ML
SOLUTION ORAL
Status: COMPLETED
Start: 2019-12-11 | End: 2019-12-11

## 2019-12-11 RX ORDER — CLOTRIMAZOLE AND BETAMETHASONE DIPROPIONATE 10; .64 MG/G; MG/G
CREAM TOPICAL 2 TIMES DAILY
Qty: 1 TUBE | Refills: 0 | Status: SHIPPED | OUTPATIENT
Start: 2019-12-11

## 2019-12-11 RX ORDER — CLOTRIMAZOLE 1 %
CREAM (GRAM) TOPICAL ONCE
Status: DISCONTINUED | OUTPATIENT
Start: 2019-12-11 | End: 2019-12-11 | Stop reason: HOSPADM

## 2019-12-11 RX ORDER — 0.9 % SODIUM CHLORIDE 0.9 %
1000 INTRAVENOUS SOLUTION INTRAVENOUS ONCE
Status: COMPLETED | OUTPATIENT
Start: 2019-12-11 | End: 2019-12-11

## 2019-12-11 RX ORDER — ACETAMINOPHEN 500 MG
1000 TABLET ORAL ONCE
Status: DISCONTINUED | OUTPATIENT
Start: 2019-12-11 | End: 2019-12-11 | Stop reason: HOSPADM

## 2019-12-11 RX ADMIN — SODIUM CHLORIDE 1500 ML: 9 INJECTION, SOLUTION INTRAVENOUS at 00:04

## 2019-12-11 RX ADMIN — ACETAMINOPHEN 1000 MG: 650 SOLUTION ORAL at 01:57

## 2019-12-11 RX ADMIN — SODIUM CHLORIDE 1000 ML: 9 INJECTION, SOLUTION INTRAVENOUS at 01:57

## 2019-12-11 RX ADMIN — CEFTRIAXONE 1 G: 1 INJECTION, POWDER, FOR SOLUTION INTRAMUSCULAR; INTRAVENOUS at 03:35

## 2019-12-11 ASSESSMENT — PAIN SCALES - GENERAL: PAINLEVEL_OUTOF10: 0

## 2019-12-13 LAB — URINE CULTURE, ROUTINE: NORMAL

## 2019-12-16 LAB
BLOOD CULTURE, ROUTINE: NORMAL
CULTURE, BLOOD 2: NORMAL

## 2019-12-23 ENCOUNTER — HOSPITAL ENCOUNTER (EMERGENCY)
Age: 19
Discharge: HOME | End: 2019-12-23
Payer: MEDICAID

## 2019-12-23 ENCOUNTER — APPOINTMENT (OUTPATIENT)
Dept: GENERAL RADIOLOGY | Age: 19
End: 2019-12-23
Payer: MEDICAID

## 2019-12-23 VITALS
HEART RATE: 96 BPM | DIASTOLIC BLOOD PRESSURE: 82 MMHG | BODY MASS INDEX: 17.93 KG/M2 | SYSTOLIC BLOOD PRESSURE: 120 MMHG | HEIGHT: 64 IN | RESPIRATION RATE: 16 BRPM | WEIGHT: 105 LBS | TEMPERATURE: 98 F | OXYGEN SATURATION: 97 %

## 2019-12-23 DIAGNOSIS — J18.9 PNEUMONIA DUE TO ORGANISM: Primary | ICD-10-CM

## 2019-12-23 LAB
INFLUENZA A BY PCR: NOT DETECTED
INFLUENZA B BY PCR: NOT DETECTED

## 2019-12-23 PROCEDURE — 99283 EMERGENCY DEPT VISIT LOW MDM: CPT

## 2019-12-23 PROCEDURE — 71045 X-RAY EXAM CHEST 1 VIEW: CPT

## 2019-12-23 PROCEDURE — 87502 INFLUENZA DNA AMP PROBE: CPT

## 2019-12-23 RX ORDER — LEVOFLOXACIN 500 MG/1
500 TABLET, FILM COATED ORAL DAILY
Qty: 10 TABLET | Refills: 0 | Status: SHIPPED | OUTPATIENT
Start: 2019-12-23 | End: 2020-01-06 | Stop reason: HOSPADM

## 2020-01-02 ENCOUNTER — APPOINTMENT (OUTPATIENT)
Dept: GENERAL RADIOLOGY | Age: 20
DRG: 139 | End: 2020-01-02
Payer: MEDICAID

## 2020-01-02 ENCOUNTER — HOSPITAL ENCOUNTER (INPATIENT)
Age: 20
LOS: 1 days | Discharge: NURSING, REHAB OR LONG-TERM CARE | DRG: 139 | End: 2020-01-09
Attending: EMERGENCY MEDICINE | Admitting: FAMILY MEDICINE
Payer: MEDICAID

## 2020-01-02 LAB
AMORPHOUS: ABNORMAL
BACTERIA: ABNORMAL /HPF
BILIRUBIN URINE: NEGATIVE
BLOOD, URINE: ABNORMAL
CLARITY: CLEAR
COLOR: YELLOW
GLUCOSE URINE: NEGATIVE MG/DL
HCT VFR BLD CALC: 43.4 % (ref 37–54)
HEMOGLOBIN: 14.3 G/DL (ref 12.5–16.5)
KETONES, URINE: NEGATIVE MG/DL
LACTIC ACID: 0.9 MMOL/L (ref 0.5–2.2)
LEUKOCYTE ESTERASE, URINE: NEGATIVE
LIPASE: 22 U/L (ref 13–60)
MCH RBC QN AUTO: 30.7 PG (ref 26–35)
MCHC RBC AUTO-ENTMCNC: 32.9 % (ref 32–34.5)
MCV RBC AUTO: 93.1 FL (ref 80–99.9)
NITRITE, URINE: NEGATIVE
PDW BLD-RTO: 12.4 FL (ref 11.5–15)
PH UA: 7.5 (ref 5–9)
PLATELET # BLD: 220 E9/L (ref 130–450)
PMV BLD AUTO: 10.3 FL (ref 7–12)
PROTEIN UA: NEGATIVE MG/DL
RBC # BLD: 4.66 E12/L (ref 3.8–5.8)
RBC UA: ABNORMAL /HPF (ref 0–2)
REASON FOR REJECTION: NORMAL
REJECTED TEST: NORMAL
SPECIFIC GRAVITY UA: 1.01 (ref 1–1.03)
UROBILINOGEN, URINE: 0.2 E.U./DL
WBC # BLD: 10.4 E9/L (ref 4.5–11.5)
WBC UA: ABNORMAL /HPF (ref 0–5)

## 2020-01-02 PROCEDURE — 84484 ASSAY OF TROPONIN QUANT: CPT

## 2020-01-02 PROCEDURE — 2580000003 HC RX 258: Performed by: EMERGENCY MEDICINE

## 2020-01-02 PROCEDURE — 83690 ASSAY OF LIPASE: CPT

## 2020-01-02 PROCEDURE — 85027 COMPLETE CBC AUTOMATED: CPT

## 2020-01-02 PROCEDURE — 94640 AIRWAY INHALATION TREATMENT: CPT

## 2020-01-02 PROCEDURE — 51701 INSERT BLADDER CATHETER: CPT

## 2020-01-02 PROCEDURE — 6370000000 HC RX 637 (ALT 250 FOR IP): Performed by: EMERGENCY MEDICINE

## 2020-01-02 PROCEDURE — 81001 URINALYSIS AUTO W/SCOPE: CPT

## 2020-01-02 PROCEDURE — 94664 DEMO&/EVAL PT USE INHALER: CPT

## 2020-01-02 PROCEDURE — 36415 COLL VENOUS BLD VENIPUNCTURE: CPT

## 2020-01-02 PROCEDURE — 93005 ELECTROCARDIOGRAM TRACING: CPT | Performed by: EMERGENCY MEDICINE

## 2020-01-02 PROCEDURE — 83605 ASSAY OF LACTIC ACID: CPT

## 2020-01-02 PROCEDURE — 99285 EMERGENCY DEPT VISIT HI MDM: CPT

## 2020-01-02 PROCEDURE — 80053 COMPREHEN METABOLIC PANEL: CPT

## 2020-01-02 PROCEDURE — 71045 X-RAY EXAM CHEST 1 VIEW: CPT

## 2020-01-02 PROCEDURE — 87040 BLOOD CULTURE FOR BACTERIA: CPT

## 2020-01-02 RX ORDER — IPRATROPIUM BROMIDE AND ALBUTEROL SULFATE 2.5; .5 MG/3ML; MG/3ML
1 SOLUTION RESPIRATORY (INHALATION)
Status: COMPLETED | OUTPATIENT
Start: 2020-01-02 | End: 2020-01-02

## 2020-01-02 RX ORDER — SODIUM CHLORIDE 9 MG/ML
INJECTION, SOLUTION INTRAVENOUS CONTINUOUS
Status: DISCONTINUED | OUTPATIENT
Start: 2020-01-02 | End: 2020-01-03 | Stop reason: SDUPTHER

## 2020-01-02 RX ADMIN — SODIUM CHLORIDE: 9 INJECTION, SOLUTION INTRAVENOUS at 22:43

## 2020-01-02 RX ADMIN — IPRATROPIUM BROMIDE AND ALBUTEROL SULFATE 1 AMPULE: 2.5; .5 SOLUTION RESPIRATORY (INHALATION) at 22:38

## 2020-01-02 RX ADMIN — IPRATROPIUM BROMIDE AND ALBUTEROL SULFATE 1 AMPULE: 2.5; .5 SOLUTION RESPIRATORY (INHALATION) at 22:39

## 2020-01-02 RX ADMIN — IPRATROPIUM BROMIDE AND ALBUTEROL SULFATE 1 AMPULE: 2.5; .5 SOLUTION RESPIRATORY (INHALATION) at 22:37

## 2020-01-03 ENCOUNTER — APPOINTMENT (OUTPATIENT)
Dept: CT IMAGING | Age: 20
DRG: 139 | End: 2020-01-03
Payer: MEDICAID

## 2020-01-03 PROBLEM — J18.9 PNEUMONIA: Status: ACTIVE | Noted: 2020-01-03

## 2020-01-03 PROBLEM — R06.02 SHORTNESS OF BREATH: Status: ACTIVE | Noted: 2020-01-03

## 2020-01-03 LAB
ALBUMIN SERPL-MCNC: 4.2 G/DL (ref 3.5–5.2)
ALP BLD-CCNC: 60 U/L (ref 40–129)
ALT SERPL-CCNC: 19 U/L (ref 0–40)
ANION GAP SERPL CALCULATED.3IONS-SCNC: 13 MMOL/L (ref 7–16)
AST SERPL-CCNC: 20 U/L (ref 0–39)
BILIRUB SERPL-MCNC: 0.4 MG/DL (ref 0–1.2)
BUN BLDV-MCNC: 8 MG/DL (ref 6–20)
CALCIUM SERPL-MCNC: 9.3 MG/DL (ref 8.6–10.2)
CHLORIDE BLD-SCNC: 102 MMOL/L (ref 98–107)
CO2: 24 MMOL/L (ref 22–29)
CREAT SERPL-MCNC: 0.3 MG/DL (ref 0.7–1.2)
EKG ATRIAL RATE: 117 BPM
EKG P AXIS: 19 DEGREES
EKG P-R INTERVAL: 132 MS
EKG Q-T INTERVAL: 316 MS
EKG QRS DURATION: 86 MS
EKG QTC CALCULATION (BAZETT): 440 MS
EKG R AXIS: 122 DEGREES
EKG T AXIS: 1 DEGREES
EKG VENTRICULAR RATE: 117 BPM
GFR AFRICAN AMERICAN: >60
GFR NON-AFRICAN AMERICAN: >60 ML/MIN/1.73
GLUCOSE BLD-MCNC: 100 MG/DL (ref 74–99)
POTASSIUM SERPL-SCNC: 3.7 MMOL/L (ref 3.5–5)
PROCALCITONIN: 0.03 NG/ML (ref 0–0.08)
SODIUM BLD-SCNC: 139 MMOL/L (ref 132–146)
TOTAL PROTEIN: 7.6 G/DL (ref 6.4–8.3)
TROPONIN: <0.01 NG/ML (ref 0–0.03)

## 2020-01-03 PROCEDURE — G0378 HOSPITAL OBSERVATION PER HR: HCPCS

## 2020-01-03 PROCEDURE — 96366 THER/PROPH/DIAG IV INF ADDON: CPT

## 2020-01-03 PROCEDURE — 6360000004 HC RX CONTRAST MEDICATION: Performed by: RADIOLOGY

## 2020-01-03 PROCEDURE — 6370000000 HC RX 637 (ALT 250 FOR IP): Performed by: FAMILY MEDICINE

## 2020-01-03 PROCEDURE — 93010 ELECTROCARDIOGRAM REPORT: CPT | Performed by: INTERNAL MEDICINE

## 2020-01-03 PROCEDURE — 96367 TX/PROPH/DG ADDL SEQ IV INF: CPT

## 2020-01-03 PROCEDURE — 2500000003 HC RX 250 WO HCPCS: Performed by: FAMILY MEDICINE

## 2020-01-03 PROCEDURE — 2580000003 HC RX 258: Performed by: EMERGENCY MEDICINE

## 2020-01-03 PROCEDURE — 6360000002 HC RX W HCPCS: Performed by: EMERGENCY MEDICINE

## 2020-01-03 PROCEDURE — 6370000000 HC RX 637 (ALT 250 FOR IP): Performed by: SURGERY

## 2020-01-03 PROCEDURE — 36415 COLL VENOUS BLD VENIPUNCTURE: CPT

## 2020-01-03 PROCEDURE — 96365 THER/PROPH/DIAG IV INF INIT: CPT

## 2020-01-03 PROCEDURE — 6360000002 HC RX W HCPCS: Performed by: FAMILY MEDICINE

## 2020-01-03 PROCEDURE — 2580000003 HC RX 258: Performed by: FAMILY MEDICINE

## 2020-01-03 PROCEDURE — 2500000003 HC RX 250 WO HCPCS: Performed by: EMERGENCY MEDICINE

## 2020-01-03 PROCEDURE — 71275 CT ANGIOGRAPHY CHEST: CPT

## 2020-01-03 PROCEDURE — 74177 CT ABD & PELVIS W/CONTRAST: CPT

## 2020-01-03 PROCEDURE — 84145 PROCALCITONIN (PCT): CPT

## 2020-01-03 PROCEDURE — 94640 AIRWAY INHALATION TREATMENT: CPT

## 2020-01-03 RX ORDER — 0.9 % SODIUM CHLORIDE 0.9 %
1000 INTRAVENOUS SOLUTION INTRAVENOUS ONCE
Status: COMPLETED | OUTPATIENT
Start: 2020-01-03 | End: 2020-01-03

## 2020-01-03 RX ORDER — GUAIFENESIN 100 MG/5ML
100 SOLUTION ORAL EVERY 4 HOURS PRN
Status: DISCONTINUED | OUTPATIENT
Start: 2020-01-03 | End: 2020-01-09 | Stop reason: HOSPADM

## 2020-01-03 RX ORDER — MUPIROCIN CALCIUM 20 MG/G
CREAM TOPICAL 3 TIMES DAILY
Status: DISCONTINUED | OUTPATIENT
Start: 2020-01-03 | End: 2020-01-03 | Stop reason: SDUPTHER

## 2020-01-03 RX ORDER — CLOTRIMAZOLE AND BETAMETHASONE DIPROPIONATE 10; .64 MG/G; MG/G
CREAM TOPICAL 2 TIMES DAILY
Status: DISCONTINUED | OUTPATIENT
Start: 2020-01-03 | End: 2020-01-09 | Stop reason: HOSPADM

## 2020-01-03 RX ORDER — METOCLOPRAMIDE HYDROCHLORIDE 5 MG/5ML
5 SOLUTION ORAL 3 TIMES DAILY
Status: DISCONTINUED | OUTPATIENT
Start: 2020-01-03 | End: 2020-01-09 | Stop reason: HOSPADM

## 2020-01-03 RX ORDER — POLYETHYLENE GLYCOL 3350 17 G/17G
17 POWDER, FOR SOLUTION ORAL DAILY
Status: DISCONTINUED | OUTPATIENT
Start: 2020-01-03 | End: 2020-01-09 | Stop reason: HOSPADM

## 2020-01-03 RX ORDER — ACETAMINOPHEN 160 MG/5ML
650 SOLUTION ORAL EVERY 4 HOURS PRN
Status: DISCONTINUED | OUTPATIENT
Start: 2020-01-03 | End: 2020-01-09 | Stop reason: HOSPADM

## 2020-01-03 RX ORDER — SODIUM CHLORIDE 9 MG/ML
INJECTION, SOLUTION INTRAVENOUS CONTINUOUS
Status: DISCONTINUED | OUTPATIENT
Start: 2020-01-03 | End: 2020-01-04

## 2020-01-03 RX ORDER — LOPERAMIDE HCL 1 MG/7.5ML
2 SOLUTION ORAL PRN
Status: DISCONTINUED | OUTPATIENT
Start: 2020-01-03 | End: 2020-01-09 | Stop reason: HOSPADM

## 2020-01-03 RX ORDER — DEXTROMETHORPHAN HYDROBROMIDE AND PROMETHAZINE HYDROCHLORIDE 15; 6.25 MG/5ML; MG/5ML
5 SYRUP ORAL EVERY 6 HOURS PRN
Status: DISCONTINUED | OUTPATIENT
Start: 2020-01-03 | End: 2020-01-09 | Stop reason: HOSPADM

## 2020-01-03 RX ORDER — IPRATROPIUM BROMIDE AND ALBUTEROL SULFATE 2.5; .5 MG/3ML; MG/3ML
1 SOLUTION RESPIRATORY (INHALATION)
Status: DISCONTINUED | OUTPATIENT
Start: 2020-01-03 | End: 2020-01-09 | Stop reason: HOSPADM

## 2020-01-03 RX ORDER — FLUTICASONE PROPIONATE 50 MCG
1 SPRAY, SUSPENSION (ML) NASAL PRN
Status: DISCONTINUED | OUTPATIENT
Start: 2020-01-03 | End: 2020-01-09 | Stop reason: HOSPADM

## 2020-01-03 RX ORDER — DEXTROMETHORPHAN HYDROBROMIDE AND PROMETHAZINE HYDROCHLORIDE 15; 6.25 MG/5ML; MG/5ML
5 SYRUP ORAL EVERY 6 HOURS PRN
Status: DISCONTINUED | OUTPATIENT
Start: 2020-01-03 | End: 2020-01-03 | Stop reason: CLARIF

## 2020-01-03 RX ORDER — GUAIFENESIN 100 MG/5ML
100 SOLUTION ORAL EVERY 4 HOURS PRN
COMMUNITY

## 2020-01-03 RX ORDER — BACITRACIN 500 [USP'U]/G
OINTMENT TOPICAL PRN
Status: DISCONTINUED | OUTPATIENT
Start: 2020-01-03 | End: 2020-01-09 | Stop reason: HOSPADM

## 2020-01-03 RX ORDER — METOPROLOL TARTRATE 5 MG/5ML
5 INJECTION INTRAVENOUS ONCE
Status: COMPLETED | OUTPATIENT
Start: 2020-01-03 | End: 2020-01-03

## 2020-01-03 RX ORDER — SENNA AND DOCUSATE SODIUM 50; 8.6 MG/1; MG/1
2 TABLET, FILM COATED ORAL DAILY
Status: DISCONTINUED | OUTPATIENT
Start: 2020-01-03 | End: 2020-01-09 | Stop reason: HOSPADM

## 2020-01-03 RX ADMIN — GUAIFENESIN 100 MG: 200 SOLUTION ORAL at 16:38

## 2020-01-03 RX ADMIN — SODIUM CHLORIDE: 9 INJECTION, SOLUTION INTRAVENOUS at 09:50

## 2020-01-03 RX ADMIN — SODIUM CHLORIDE 1000 ML: 9 INJECTION, SOLUTION INTRAVENOUS at 04:29

## 2020-01-03 RX ADMIN — SENNOSIDES AND DOCUSATE SODIUM 2 TABLET: 8.6; 5 TABLET ORAL at 16:22

## 2020-01-03 RX ADMIN — IOPAMIDOL 110 ML: 755 INJECTION, SOLUTION INTRAVENOUS at 01:32

## 2020-01-03 RX ADMIN — CLOTRIMAZOLE AND BETAMETHASONE DIPROPIONATE: 10; .5 CREAM TOPICAL at 23:07

## 2020-01-03 RX ADMIN — DOXYCYCLINE 100 MG: 100 INJECTION, POWDER, LYOPHILIZED, FOR SOLUTION INTRAVENOUS at 02:55

## 2020-01-03 RX ADMIN — METOPROLOL TARTRATE 5 MG: 5 INJECTION INTRAVENOUS at 11:46

## 2020-01-03 RX ADMIN — DOXYCYCLINE 100 MG: 100 INJECTION, POWDER, LYOPHILIZED, FOR SOLUTION INTRAVENOUS at 16:23

## 2020-01-03 RX ADMIN — ENOXAPARIN SODIUM 40 MG: 40 INJECTION SUBCUTANEOUS at 09:50

## 2020-01-03 RX ADMIN — CEFTRIAXONE SODIUM 1 G: 1 INJECTION, POWDER, FOR SOLUTION INTRAMUSCULAR; INTRAVENOUS at 02:04

## 2020-01-03 RX ADMIN — METOCLOPRAMIDE HYDROCHLORIDE 5 MG: 5 SOLUTION ORAL at 23:07

## 2020-01-03 RX ADMIN — SODIUM CHLORIDE: 9 INJECTION, SOLUTION INTRAVENOUS at 23:07

## 2020-01-03 RX ADMIN — CLOTRIMAZOLE AND BETAMETHASONE DIPROPIONATE: 10; .5 CREAM TOPICAL at 09:51

## 2020-01-03 RX ADMIN — MUPIROCIN: 20 OINTMENT TOPICAL at 23:06

## 2020-01-03 RX ADMIN — MUPIROCIN: 20 OINTMENT TOPICAL at 16:23

## 2020-01-03 RX ADMIN — POLYETHYLENE GLYCOL 3350 17 G: 17 POWDER, FOR SOLUTION ORAL at 16:22

## 2020-01-03 RX ADMIN — IPRATROPIUM BROMIDE AND ALBUTEROL SULFATE 1 AMPULE: 2.5; .5 SOLUTION RESPIRATORY (INHALATION) at 19:34

## 2020-01-03 ASSESSMENT — PAIN SCALES - GENERAL: PAINLEVEL_OUTOF10: 0

## 2020-01-03 ASSESSMENT — ENCOUNTER SYMPTOMS
COUGH: 1
ABDOMINAL DISTENTION: 1
COLOR CHANGE: 0
SHORTNESS OF BREATH: 1

## 2020-01-03 NOTE — ED NOTES
Pt has 18 in right hand/wrist that draws and flushes fine  CT did not want to do CTA with 18 ga and asked to have another site at least in forearm. After previous nurse trying x2 unsuccessful and this shift nurse attempting IV x2 unsuccessful dr Mignon Zhu notified and he asked to explain to CT that current IV returns and flushes fine. CT made aware and was told would speak to CT techs and let them know.   As of yet no one sent for this pt     Pamela Smiley RN  01/03/20 0041

## 2020-01-03 NOTE — PROGRESS NOTES
Message to Dr Simona Mac re: guardian requesting duonebs/cough syrup and concerned with pt's weak cough  Holly Shah RN  4:40 PM

## 2020-01-03 NOTE — CARE COORDINATION
Met with legal guardian Joetta Gosselin) who is at bedside. Pt lives in a group home [facility] ([address] [facility]) pt has a hospital bed, nathan lift, wheel chair, and Cpap (which is on hold). Plan is to return to group home at discharge. Spoke with Beatriz Argueta ([phone]) , who states they can take pt back at any point as long as they can handle what is being ordered. So if there is any surgical interventions or new nursing needs they would prefer the pt go to a SNF before returning to group home. Gave a SNF list to legal guardian for viewing, awaiting surgical input. Pt will need transport to group [place], envelope and ambulance form in soft chart. Nurse to nurse to be give to Severo Kea if no answer nurse to nurse to Rachel Wallace [phone]. Dipesh Post LSW  The Plan for Transition of Care is related to the following treatment goals: The Patient and/or patient representative  was provided with a choice of provider and agrees   with the discharge plan. [x] Yes [] No    Freedom of choice list was provided with basic dialogue that supports the patient's individualized plan of care/goals, treatment preferences and shares the quality data associated with the providers.  [x] Yes [] No

## 2020-01-03 NOTE — ED NOTES
Blood cultures obtained from L hand, per policy. Set (two of two) drawn at this time.              Sami Blowers, RN  01/02/20 9860

## 2020-01-03 NOTE — H&P
Tam Morgan is an 22 y.o.  male. Patient was brought to the ER for a persistent cough. He had been on levaquin without improvement. Family also notes his abdomen is distended. Past Medical History:   Diagnosis Date    Cerebral palsy, quadriplegic ([address]        Past Surgical History:   Procedure Laterality Date    BACLOFEN PUMP IMPLANTATION      GASTROSTOMY TUBE PLACEMENT      TENDON RELEASE         History reviewed. No pertinent family history.      Social History     Tobacco Use    Smoking status: Passive Smoke Exposure - Never Smoker    Smokeless tobacco: Never Used   Substance Use Topics    Alcohol use: Never     Frequency: Never    Drug use: Never       Current Facility-Administered Medications   Medication Dose Route Frequency Provider Last Rate Last Dose    Acetaminophen SYRP 20 mL  20 mL PEG Tube Q4H PRN Bev Peña MD        bismuth subsalicylate (PEPTO BISMOL) 262 MG/15ML suspension 15 mL  15 mL Per G Tube Q4H PRN Bev Peña MD        clotrimazole-betamethasone (LOTRISONE) cream   Topical BID Bev Peña MD        fluticasone [facility]) 50 MCG/ACT nasal spray 1 spray  1 spray Each Nostril PRN Bev Peña MD        guaiFENesin (ROBITUSSIN) 100 MG/5ML oral solution 100 mg  100 mg Oral Q4H PRN Bev Peña MD        ibuprofen (ADVIL;MOTRIN) 100 MG/5ML suspension 400 mg  400 mg Per G Tube Q4H PRN Bev Peña MD        loperamide (IMODIUM) 1 MG/7.5ML solution 2 mg  2 mg Oral PRN Bev Peña MD        magnesium hydroxide (MILK OF MAGNESIA) 400 MG/5ML suspension 15 mL  15 mL Per G Tube Daily PRN Bev Peña MD        metoclopramide [facility]) 5 MG/5ML solution 5 mg  5 mg PEG Tube TID Bev Peña MD        mupirocin [facility]) 2 % cream   Topical TID Bev Peña MD        promethazine-dextromethorphan (PROMETHAZINE-DM) 6.25-15 MG/5ML syrup 5 mL  5 mL Per G Tube Q6H PRN Jennifer Cano Kristi Morales MD        famotidine (PEPCID) 40 MG/5ML suspension 40 mg  40 mg Per G Tube Daily Estephanie Bettencourt MD        zinc oxide (PINXAV) 30 % ointment   Topical PRN Estephanie Bettencourt MD        0.9 % sodium chloride infusion   Intravenous Continuous Estephanie Bettencourt MD        enoxaparin (LOVENOX) injection 40 mg  40 mg Subcutaneous Daily Estephanie Bettencourt MD        doxycycline (VIBRAMYCIN) 100 mg in dextrose 5 % 100 mL IVPB  100 mg Intravenous Q12H Estephanie Bettencourt MD        cefTRIAXone (ROCEPHIN) 1 g in sterile water 10 mL IV syringe  1 g Intravenous Q24H Estephanie Bettencourt MD        0.9 % sodium chloride infusion   Intravenous Continuous Deangelo Montano  mL/hr at 01/02/20 2136       Allergies: Allergies   Allergen Reactions    Seasonal Itching and Other (See Comments)     Eye drainage  Eye drainage  Nasal congestion.  Valium [Diazepam] Other (See Comments)     He received 1 mg and became obtunded and hypo-ventated, He would be better to use versed for spasticity. Per Umang. Active Problems:    Pneumonia    Shortness of breath  Resolved Problems:    * No resolved hospital problems. *    Blood pressure 121/79, pulse 115, temperature 98.1 °F (36.7 °C), temperature source Temporal, resp. rate 16, height 5' 4\" (1.626 m), weight 105 lb 1 oz (47.7 kg), SpO2 97 %. Review of Systems   Constitutional: Positive for activity change. HENT: Positive for congestion. Respiratory: Positive for cough and shortness of breath. Cardiovascular: Negative for chest pain. Gastrointestinal: Positive for abdominal distention. Genitourinary: Negative for difficulty urinating. Musculoskeletal: Negative for arthralgias. Skin: Negative for color change. Hematological: Negative for adenopathy. Physical Exam  Constitutional:       Appearance: Normal appearance. HENT:      Head: Normocephalic.       Right Ear: Tympanic membrane normal.      Left Ear: Tympanic membrane normal.      Nose: Nose normal.      Mouth/Throat:      Mouth: Mucous membranes are moist.   Eyes:      Pupils: Pupils are equal, round, and reactive to light. Neck:      Musculoskeletal: Normal range of motion and neck supple. Cardiovascular:      Rate and Rhythm: Normal rate and regular rhythm. Pulses: Normal pulses. Heart sounds: Normal heart sounds. Pulmonary:      Effort: Pulmonary effort is normal. No respiratory distress. Breath sounds: Wheezing present. Abdominal:      General: Abdomen is flat. Bowel sounds are normal. There is distension. Tenderness: There is no tenderness. Skin:     General: Skin is warm and dry. Capillary Refill: Capillary refill takes less than 2 seconds. Assessment:  URI/ possible pneumonia  Ileus  CP    Plan:  IV doxy and rocephin. Check procalcitonin. Surgery consult. Monitor labs. Continue rest of meds.     Catarino Martinez MD  1/3/2020

## 2020-01-03 NOTE — CONSULTS
GENERAL SURGERY  CONSULT NOTE            Date: 1/3/2020        Patient Name: Nhung Carbajal     YOB: 2000      Age:  22 y.o. Consult by: Dr Kurt Marvin to: Dr Quyen Bonner  Reason:  Ileus evaluation    Chief Complaint     Chief Complaint   Patient presents with    Fever     (EMS called to facility for fever, gave tylenol at facility) multiple compliants(abdomen distended, not normally)          History Obtained From   patient, family caregiver - aunt    History of Present Illness   Nhung Carbajal is a 22 y.o. male with cerebral palsy who presented with cough and emesis- consulted for ileus as seen on CT IV contrast abdomen pelvis. Pt was evaluated on 12/10 for pneumonia and given a 5 day course of Zithromax and presented again on 12/23 for fever and was discharged on a 10 day course of levaquin. Current symptoms started 2 days ago with a cough that worsened in frequency leading the pt to 'vomit after severe coughing' prior to presentation. He has been having tube feeds 460 cc @ 230 ml/hour over 2 hours BID pior to this, and had not had a bowel movement for several days. During examination pt had a bowel movement and pts last episode of emesis (non-bilious, no-bloody) was yesterday morning. In the course of ED pt was given IV fluids, reglan, doxycycline and rocephin. Pt has a significant hx of chronic constipation and has episodes of emesis as it worsens. As per caregiver, pt last saw Dr. Zonia Fothergill a month ago, prior workup was done at [facility] and pt is on   Past Medical History     Past Medical History:   Diagnosis Date    Cerebral palsy, quadriplegic [facility])         Past Surgical History     Past Surgical History:   Procedure Laterality Date    BACLOFEN PUMP IMPLANTATION      GASTROSTOMY TUBE PLACEMENT      TENDON RELEASE          Medications - Prior to Admission and Current Meds     Prior to Admission medications    Medication Sig Start Date End Date Taking?  Authorizing Provider CARDIOVASC:  *Normal rate, no cyanosis, regular rhythm. ABDOMEN:  Soft, distended, non-tender. Baclofen pump on left-quadrant. No guarding / rigidity / rebound. RECTAL:  No hemorrhoids. No stool in vault. LIMBS:  Lean limbs with increased tone, no edema. NEURO:  Face symmetric  SKIN:  Warm, dry. Labs    CBC  Recent Labs     01/02/20  2216   WBC 10.4   HGB 14.3   HCT 43.4        BMP  Recent Labs     01/02/20  2332      K 3.7      CO2 24   BUN 8   CREATININE 0.3*   CALCIUM 9.3     Liver Function  Recent Labs     01/02/20 2216 01/02/20  2332   LIPASE 22  --    BILITOT  --  0.4   AST  --  20   ALT  --  19   ALKPHOS  --  60   PROT  --  7.6   LABALBU  --  4.2     No results for input(s): LACTATE in the last 72 hours. No results for input(s): INR, PTT in the last 72 hours. Invalid input(s): PT    Imaging/Diagnostics Last 24 Hours   Ct Abdomen Pelvis W Iv Contrast Additional Contrast? None    Result Date: 1/3/2020  PROCEDURE INFORMATION: Exam: CT Abdomen And Pelvis With Contrast Exam date and time: 1/2/2020 10:00 PM Age: 22years old Clinical indication: Abdominal pain; Generalized; Additional info: Distention possible obstruction TECHNIQUE: Imaging protocol: Computed tomography of the abdomen and pelvis with intravenous contrast. Radiation optimization: All CT scans at this facility use at least one of these dose optimization techniques: automated exposure control; mA and/or kV adjustment per patient size (includes targeted exams where dose is matched to clinical indication); or iterative reconstruction. Contrast material: ISOVUE 370; Contrast volume: 110 ml; Contrast route: IV;  COMPARISON: CT ABDOMEN PELVIS W CONTRAST 10/22/2019 6:45 PM FINDINGS: Tubes, catheters and devices: Percutaneous enterogastric tube is noted with tip in good position. Liver: Normal. No mass. Gallbladder and bile ducts: Normal. No calcified stones. No ductal dilation. Pancreas: Normal. No ductal dilation. atelectasis or pneumonia. Severe The cardiomediastinal contour is unremarkable. No pneumothorax or pleural effusion. Ho rods are seen extending the thoracic and the lumbar spine. No fracture or bony destructive lesion seen. No acute cardiopulmonary abnormality. Small right basilar pulmonary opacity, which may represent atelectasis or pneumonia. Cta Chest W Contrast    Result Date: 1/3/2020  PROCEDURE INFORMATION: Exam: CT Angiography Chest With Contrast Exam date and time: 1/2/2020 10:45 PM Age: 22years old Clinical indication: Shortness of breath; Additional info: SOB tachycardic bedridden not improved with antibiotics TECHNIQUE: Imaging protocol: Computed tomographic angiography of the chest with intravenous contrast. 3D rendering: MIP and/or 3D reconstructed images were created by the technologist. Radiation optimization: All CT scans at this facility use at least one of these dose optimization techniques: automated exposure control; mA and/or kV adjustment per patient size (includes targeted exams where dose is matched to clinical indication); or iterative reconstruction. Contrast material: ISOVUE 370; Contrast volume: 110 ml; Contrast route: IV;  COMPARISON: DX XR CHEST PORTABLE 1/2/2020 10:15 PM FINDINGS: Tubes, catheters and devices: The CT scanogram demonstrates battery stimulator pack anterior subcutaneous tissues lower right abdomen. There are 2 epidural leads seen in the spinal canal which extend to about the level of the aortic arch near the AP window. Pulmonary arteries: Normal. No pulmonary emboli. Aorta: Unremarkable. No aortic aneurysm. No aortic dissection. Lungs: Unremarkable. No consolidation. No masses. Pleural space: Unremarkable. No pneumothorax. No pleural effusion. Heart: Unremarkable. No cardiomegaly. No pericardial effusion. Lymph nodes: Unremarkable. No enlarged lymph nodes.  Bones/joints: Multilevel pedicle screws and dual spinal fusion rods and bone the visualized spine by Charly Leon MD on 1/3/2020 at 5:49 PM

## 2020-01-03 NOTE — ED PROVIDER NOTES
Department of Emergency Medicine   ED  Provider Note  Admit Date/RoomTime: 1/2/2020  9:55 PM  ED Room: 20/20          History of Present Illness:  1/2/20, Time: 9:55 PM      Unable to obtain a complete HPI due to this patient's hx of cerebral palsy. Angel Lau is a 22 y.o. male presenting to the ED for cough, beginning 2 days ago. The complaint has been persistent, moderate in severity, and worsened by nothing. Pt comes to the ED by EMS. EMS states that care giver had reported that pt was been coughing and vomiting. They report that his abdomen is more distended. 3 of bowel obstructions. . Also reported that pt had a fever of 102 F. EMS reports that pt has been 94-95% on room air for them. Patient was recently treated with oral antibiotics for pneumonia    Review of Systems:   Unable to obtain a complete ROS due to the patients hx of cerebral palsy. --------------------------------------------- PAST HISTORY ---------------------------------------------  Past Medical History:  has a past medical history of Cerebral palsy, quadriplegic ([address] Past Surgical History:  has a past surgical history that includes baclofen pump implantation; tendon release; and Gastrostomy tube placement. Social History:  reports that he is a non-smoker but has been exposed to tobacco smoke. He has never used smokeless tobacco. He reports that he does not drink alcohol or use drugs. Family History: family history is not on file. The patients home medications have been reviewed. Allergies: Seasonal and Valium [diazepam]      ---------------------------------------------------PHYSICAL EXAM--------------------------------------    Constitutional/General: Alert. Appears in no distress  Head: Normocephalic and atraumatic  Eyes: PERRL, EOMI  Mouth: Oropharynx clear, handling secretions  Neck: Supple, full ROM  Respiratory: Has wheezes bilaterally, no rales, or rhonchi.  Not in respiratory I did speak to Barry Gallegos who will admit patient    Counseling: The emergency provider has spoken with the care giver and discussed todays results, in addition to providing specific details for the plan of care and counseling regarding the diagnosis and prognosis. Questions are answered at this time and they are agreeable with the plan.       --------------------------------- IMPRESSION AND DISPOSITION ---------------------------------    IMPRESSION  1. Pneumonia due to organism    2. Ileus ([facility] [address]        DISPOSITION  Disposition: Admit to telemetry  Patient condition is stable    SCRIBE ATTESTATION  1/2/20, 9:55 PM.    This note is prepared by Juan London acting as Scribe for Leila House MD.    Leila House MD:  The scribe's documentation has been prepared under my direction and personally reviewed by me in its entirety. I confirm that the note above accurately reflects all work, treatment, procedures, and medical decision making performed by me.              Leila House MD  01/02/20 [id number]       Leila House MD  01/03/20 8915

## 2020-01-03 NOTE — ED NOTES
Straight catheterization performed to obtain U/A as ordered. Procedure explained. Tolerated without difficulty noted by no s/sx of discomfort/pain. Immediate return of malodorous, straw yellow urine. Total output 125 ml. Ainsley care provided and patient repositioned.         Mary Beth Cheatham RN  01/02/20 0166

## 2020-01-04 LAB
ANION GAP SERPL CALCULATED.3IONS-SCNC: 19 MMOL/L (ref 7–16)
BUN BLDV-MCNC: 6 MG/DL (ref 6–20)
CALCIUM SERPL-MCNC: 9.4 MG/DL (ref 8.6–10.2)
CHLORIDE BLD-SCNC: 102 MMOL/L (ref 98–107)
CO2: 20 MMOL/L (ref 22–29)
CREAT SERPL-MCNC: 0.3 MG/DL (ref 0.7–1.2)
GFR AFRICAN AMERICAN: >60
GFR NON-AFRICAN AMERICAN: >60 ML/MIN/1.73
GLUCOSE BLD-MCNC: 55 MG/DL (ref 74–99)
HCT VFR BLD CALC: 41.5 % (ref 37–54)
HEMOGLOBIN: 13.4 G/DL (ref 12.5–16.5)
MCH RBC QN AUTO: 31.1 PG (ref 26–35)
MCHC RBC AUTO-ENTMCNC: 32.3 % (ref 32–34.5)
MCV RBC AUTO: 96.3 FL (ref 80–99.9)
PDW BLD-RTO: 12 FL (ref 11.5–15)
PLATELET # BLD: 197 E9/L (ref 130–450)
PMV BLD AUTO: 10.7 FL (ref 7–12)
POTASSIUM SERPL-SCNC: 3.7 MMOL/L (ref 3.5–5)
RBC # BLD: 4.31 E12/L (ref 3.8–5.8)
SODIUM BLD-SCNC: 141 MMOL/L (ref 132–146)
WBC # BLD: 8.2 E9/L (ref 4.5–11.5)

## 2020-01-04 PROCEDURE — 94640 AIRWAY INHALATION TREATMENT: CPT

## 2020-01-04 PROCEDURE — 80048 BASIC METABOLIC PNL TOTAL CA: CPT

## 2020-01-04 PROCEDURE — 36415 COLL VENOUS BLD VENIPUNCTURE: CPT

## 2020-01-04 PROCEDURE — 99243 OFF/OP CNSLTJ NEW/EST LOW 30: CPT | Performed by: SURGERY

## 2020-01-04 PROCEDURE — 2500000003 HC RX 250 WO HCPCS: Performed by: FAMILY MEDICINE

## 2020-01-04 PROCEDURE — 6370000000 HC RX 637 (ALT 250 FOR IP): Performed by: FAMILY MEDICINE

## 2020-01-04 PROCEDURE — G0378 HOSPITAL OBSERVATION PER HR: HCPCS

## 2020-01-04 PROCEDURE — 2580000003 HC RX 258: Performed by: FAMILY MEDICINE

## 2020-01-04 PROCEDURE — 6370000000 HC RX 637 (ALT 250 FOR IP): Performed by: SURGERY

## 2020-01-04 PROCEDURE — 85027 COMPLETE CBC AUTOMATED: CPT

## 2020-01-04 PROCEDURE — 6360000002 HC RX W HCPCS: Performed by: FAMILY MEDICINE

## 2020-01-04 RX ORDER — LACTULOSE 10 G/15ML
20 SOLUTION ORAL 3 TIMES DAILY
Status: DISCONTINUED | OUTPATIENT
Start: 2020-01-04 | End: 2020-01-06

## 2020-01-04 RX ORDER — DEXTROSE AND SODIUM CHLORIDE 5; .45 G/100ML; G/100ML
INJECTION, SOLUTION INTRAVENOUS CONTINUOUS
Status: DISCONTINUED | OUTPATIENT
Start: 2020-01-04 | End: 2020-01-08

## 2020-01-04 RX ADMIN — DEXTROSE AND SODIUM CHLORIDE: 5; 450 INJECTION, SOLUTION INTRAVENOUS at 11:08

## 2020-01-04 RX ADMIN — IPRATROPIUM BROMIDE AND ALBUTEROL SULFATE 1 AMPULE: 2.5; .5 SOLUTION RESPIRATORY (INHALATION) at 11:59

## 2020-01-04 RX ADMIN — CEFTRIAXONE SODIUM 1 G: 1 INJECTION, POWDER, FOR SOLUTION INTRAMUSCULAR; INTRAVENOUS at 02:49

## 2020-01-04 RX ADMIN — LACTULOSE 20 G: 20 SOLUTION ORAL at 23:03

## 2020-01-04 RX ADMIN — METOCLOPRAMIDE HYDROCHLORIDE 5 MG: 5 SOLUTION ORAL at 08:00

## 2020-01-04 RX ADMIN — METOCLOPRAMIDE HYDROCHLORIDE 5 MG: 5 SOLUTION ORAL at 15:53

## 2020-01-04 RX ADMIN — MUPIROCIN: 20 OINTMENT TOPICAL at 15:53

## 2020-01-04 RX ADMIN — DEXTROSE AND SODIUM CHLORIDE: 5; 450 INJECTION, SOLUTION INTRAVENOUS at 21:26

## 2020-01-04 RX ADMIN — ENOXAPARIN SODIUM 40 MG: 40 INJECTION SUBCUTANEOUS at 09:25

## 2020-01-04 RX ADMIN — POLYETHYLENE GLYCOL 3350 17 G: 17 POWDER, FOR SOLUTION ORAL at 08:00

## 2020-01-04 RX ADMIN — DOXYCYCLINE 100 MG: 100 INJECTION, POWDER, LYOPHILIZED, FOR SOLUTION INTRAVENOUS at 15:53

## 2020-01-04 RX ADMIN — IPRATROPIUM BROMIDE AND ALBUTEROL SULFATE 1 AMPULE: 2.5; .5 SOLUTION RESPIRATORY (INHALATION) at 18:47

## 2020-01-04 RX ADMIN — LACTULOSE 20 G: 20 SOLUTION ORAL at 15:53

## 2020-01-04 RX ADMIN — CLOTRIMAZOLE AND BETAMETHASONE DIPROPIONATE: 10; .5 CREAM TOPICAL at 08:00

## 2020-01-04 RX ADMIN — IPRATROPIUM BROMIDE AND ALBUTEROL SULFATE 1 AMPULE: 2.5; .5 SOLUTION RESPIRATORY (INHALATION) at 15:34

## 2020-01-04 RX ADMIN — MUPIROCIN: 20 OINTMENT TOPICAL at 08:00

## 2020-01-04 RX ADMIN — METOCLOPRAMIDE HYDROCHLORIDE 5 MG: 5 SOLUTION ORAL at 23:03

## 2020-01-04 RX ADMIN — SODIUM CHLORIDE: 9 INJECTION, SOLUTION INTRAVENOUS at 07:48

## 2020-01-04 RX ADMIN — Medication 40 MG: at 08:00

## 2020-01-04 RX ADMIN — SENNOSIDES AND DOCUSATE SODIUM 2 TABLET: 8.6; 5 TABLET ORAL at 08:00

## 2020-01-04 RX ADMIN — DOXYCYCLINE 100 MG: 100 INJECTION, POWDER, LYOPHILIZED, FOR SOLUTION INTRAVENOUS at 02:49

## 2020-01-04 NOTE — PROGRESS NOTES
chronic constipation w/ reliance on EN for nutritional support. Pt is nonverbal w/ hx of CP, will provide updated recommendations for inpatient use. Malnutrition Assessment:  · Malnutrition Status: At risk for malnutrition  · Context: Chronic illness  · Findings of the 6 clinical characteristics of malnutrition (Minimum of 2 out of 6 clinical characteristics is required to make the diagnosis of moderate or severe Protein Calorie Malnutrition based on AND/ASPEN Guidelines):  1. Energy Intake-Less than or equal to 75% of estimated energy requirement, Greater than or equal to 3 months(unsure of TF order PTA- w/ last nutrition note 10/2019 )    2. Weight Loss-No significant weight loss,    3. Fat Loss-No significant subcutaneous fat loss,    4. Muscle Loss-No significant muscle mass loss,    5. Fluid Accumulation-No significant fluid accumulation,    6.   Strength-Not measured    Nutrition Risk Level: High    Nutrient Needs:  · Estimated Daily Total Kcal: 4621-2398(30-35)  · Estimated Daily Protein (g): 70-90(1.5-1.8)  · Estimated Daily Total Fluid (ml/day): 1400ml     Nutrition Diagnosis:   · Problem: Inadequate oral intake  · Etiology: related to Cognitive or neurological impairment(2/2 CP w/ dysphagia, EN dependence )     Signs and symptoms:  as evidenced by NPO status due to medical condition, Nutrition support - EN    Objective Information:  · Nutrition-Focused Physical Findings: +i/os, oriented x3 w/ hx of CP nonverbal @baseline w/ contractures to extremities, PEG present w/EN dependence, distended abd on admit w/ noted ileus and hx of chronic constipation, hypoactive bs, no edema noted   · Wound Type: None  · Current Nutrition Therapies:  · Oral Diet Orders: NPO   · Oral Diet intake: NPO  · Anthropometric Measures:  · Ht: 5' 4\" (162.6 cm)   · Current Body Wt: 106 lb (48.1 kg)(1/4 actual bedcale )  · Admission Body Wt: 105 lb (47.6 kg)(1/3 actual bedcale )  · Usual Body Wt: 98 lb (44.5 kg)(9/2019 actual per EMR )  · % Weight Change:  ,  noted wt gain x 4 months per EMR   · Ideal Body Wt: 130 lb (59 kg), % Ideal Body 82%  · BMI Classification: BMI <18.5 Underweight    Nutrition Interventions:   Continue NPO(TF recs given )  Continued Inpatient Monitoring, Coordination of Care(D/w RN pt status and TF rec )    Nutrition Evaluation:   · Evaluation: Goals set   · Goals: nutrition as medically feasible     · Monitoring: Nutrition Progression, TF Intake, Skin Integrity, TF Tolerance, I&O, Mental Status/Confusion, Pertinent Labs, Weight, Constipation, Monitor Bowel Function      Electronically signed by Giovanni Menard, GILMA, LD on 1/4/20 at 1:17 PM    Contact Number: [phone]

## 2020-01-04 NOTE — PROGRESS NOTES
Tam Morgan is a 22 y.o. male patient. Patient resting comfortably.     Current Facility-Administered Medications   Medication Dose Route Frequency Provider Last Rate Last Dose    acetaminophen (TYLENOL) 160 MG/5ML solution 650 mg  650 mg PEG Tube Q4H PRN Bev Peña MD        bismuth subsalicylate (PEPTO BISMOL) 262 MG/15ML suspension 15 mL  15 mL Per G Tube Q4H PRN Bev Peña MD        clotrimazole-betamethasone (LOTRISONE) cream   Topical BID Bev Peña MD        fluticasone [facility]) 50 MCG/ACT nasal spray 1 spray  1 spray Each Nostril PRN Bev Peña MD        guaiFENesin (ROBITUSSIN) 100 MG/5ML oral solution 100 mg  100 mg Oral Q4H PRN Bev Peña MD   100 mg at 01/03/20 1638    ibuprofen (ADVIL;MOTRIN) 100 MG/5ML suspension 400 mg  400 mg Per G Tube Q4H PRN Bev Peña MD        loperamide (IMODIUM) 1 MG/7.5ML solution 2 mg  2 mg Oral PRN Bev Peña MD        magnesium hydroxide (MILK OF MAGNESIA) 400 MG/5ML suspension 15 mL  15 mL Per G Tube Daily PRN Bev Peña MD        metoclopramide [facility]) 5 MG/5ML solution 5 mg  5 mg PEG Tube TID Bev Peña MD   5 mg at 01/03/20 2307    famotidine (PEPCID) suspension 40 mg  40 mg Per G Tube Daily Bev Peña MD        zinc oxide 20 % ointment   Topical PRN Bev Peña MD        0.9 % sodium chloride infusion   Intravenous Continuous Bev Peña  mL/hr at 01/03/20 2307      enoxaparin (LOVENOX) injection 40 mg  40 mg Subcutaneous Daily Bev Peña MD   40 mg at 01/03/20 0950    doxycycline (VIBRAMYCIN) 100 mg in dextrose 5 % 100 mL IVPB  100 mg Intravenous Q12H Bev Peña MD   Stopped at 01/04/20 0349    cefTRIAXone (ROCEPHIN) 1 g in sterile water 10 mL IV syringe  1 g Intravenous Q24H Bev Peña MD   1 g at 01/04/20 0249    promethazine-dextromethorphan (PROMETHAZINE-DM) 6.25-15 MG/5ML syrup 5 mL  5 mL Per G Tube Q6H PRN Bev Peña MD        pirocin [facility]) 2 % ointment   Topical TID Bev Peña MD        sennosides-docusate sodium (SENOKOT-S) 8.6-50 MG tablet 2 tablet  2 tablet Oral Daily Kenzie Owens MD   2 tablet at 01/03/20 1622    polyethylene glycol (GLYCOLAX) packet 17 g  17 g Oral Daily Kenzie Owens MD   17 g at 01/03/20 1622    ipratropium-albuterol (DUONEB) nebulizer solution 1 ampule  1 ampule Inhalation Q4H WA Bev Peña MD   1 ampule at 01/03/20 1934     Allergies   Allergen Reactions    Seasonal Itching and Other (See Comments)     Eye drainage  Eye drainage  Nasal congestion.  Valium [Diazepam] Other (See Comments)     He received 1 mg and became obtunded and hypo-ventated, He would be better to use versed for spasticity. Per [place]. Active Problems:    Pneumonia    Shortness of breath  Resolved Problems:    * No resolved hospital problems. *    Blood pressure 121/73, pulse 108, temperature 97.9 °F (36.6 °C), temperature source Temporal, resp. rate 15, height 5' 4\" (1.626 m), weight 105 lb 1 oz (47.7 kg), SpO2 94 %. Subjective:  Symptoms:  Stable. Diet:  Adequate intake. Activity level: Normal.    Pain:  He reports no pain. Objective:  General Appearance:  Comfortable. Vital signs: (most recent): Blood pressure 121/73, pulse 108, temperature 97.9 °F (36.6 °C), temperature source Temporal, resp. rate 15, height 5' 4\" (1.626 m), weight 105 lb 1 oz (47.7 kg), SpO2 94 %. No fever. Lungs: There are decreased breath sounds. Assessment:  (URI/ possible pneumonia  Ileus  CP). Plan:   (Surgery ok to resume diet. Continue Abx. Monitor labs and cultures. ).        Bev Peña MD  1/4/2020

## 2020-01-05 LAB
ANION GAP SERPL CALCULATED.3IONS-SCNC: 14 MMOL/L (ref 7–16)
BUN BLDV-MCNC: 3 MG/DL (ref 6–20)
CALCIUM SERPL-MCNC: 9.5 MG/DL (ref 8.6–10.2)
CHLORIDE BLD-SCNC: 101 MMOL/L (ref 98–107)
CO2: 25 MMOL/L (ref 22–29)
CREAT SERPL-MCNC: 0.3 MG/DL (ref 0.7–1.2)
GFR AFRICAN AMERICAN: >60
GFR NON-AFRICAN AMERICAN: >60 ML/MIN/1.73
GLUCOSE BLD-MCNC: 94 MG/DL (ref 74–99)
HCT VFR BLD CALC: 41.7 % (ref 37–54)
HEMOGLOBIN: 13.8 G/DL (ref 12.5–16.5)
MCH RBC QN AUTO: 30.7 PG (ref 26–35)
MCHC RBC AUTO-ENTMCNC: 33.1 % (ref 32–34.5)
MCV RBC AUTO: 92.7 FL (ref 80–99.9)
PDW BLD-RTO: 12.1 FL (ref 11.5–15)
PLATELET # BLD: 211 E9/L (ref 130–450)
PMV BLD AUTO: 10.4 FL (ref 7–12)
POTASSIUM SERPL-SCNC: 3.4 MMOL/L (ref 3.5–5)
RBC # BLD: 4.5 E12/L (ref 3.8–5.8)
SODIUM BLD-SCNC: 140 MMOL/L (ref 132–146)
WBC # BLD: 7.9 E9/L (ref 4.5–11.5)

## 2020-01-05 PROCEDURE — 94640 AIRWAY INHALATION TREATMENT: CPT

## 2020-01-05 PROCEDURE — 36415 COLL VENOUS BLD VENIPUNCTURE: CPT

## 2020-01-05 PROCEDURE — G0378 HOSPITAL OBSERVATION PER HR: HCPCS

## 2020-01-05 PROCEDURE — 6370000000 HC RX 637 (ALT 250 FOR IP): Performed by: FAMILY MEDICINE

## 2020-01-05 PROCEDURE — 80048 BASIC METABOLIC PNL TOTAL CA: CPT

## 2020-01-05 PROCEDURE — 2580000003 HC RX 258: Performed by: FAMILY MEDICINE

## 2020-01-05 PROCEDURE — 6370000000 HC RX 637 (ALT 250 FOR IP): Performed by: SURGERY

## 2020-01-05 PROCEDURE — 6360000002 HC RX W HCPCS: Performed by: FAMILY MEDICINE

## 2020-01-05 PROCEDURE — 85027 COMPLETE CBC AUTOMATED: CPT

## 2020-01-05 PROCEDURE — 2500000003 HC RX 250 WO HCPCS: Performed by: FAMILY MEDICINE

## 2020-01-05 RX ORDER — ONDANSETRON 2 MG/ML
4 INJECTION INTRAMUSCULAR; INTRAVENOUS EVERY 4 HOURS PRN
Status: DISCONTINUED | OUTPATIENT
Start: 2020-01-05 | End: 2020-01-09 | Stop reason: HOSPADM

## 2020-01-05 RX ADMIN — IPRATROPIUM BROMIDE AND ALBUTEROL SULFATE 1 AMPULE: 2.5; .5 SOLUTION RESPIRATORY (INHALATION) at 20:05

## 2020-01-05 RX ADMIN — DOXYCYCLINE 100 MG: 100 INJECTION, POWDER, LYOPHILIZED, FOR SOLUTION INTRAVENOUS at 02:33

## 2020-01-05 RX ADMIN — DEXTROSE AND SODIUM CHLORIDE: 5; 450 INJECTION, SOLUTION INTRAVENOUS at 02:33

## 2020-01-05 RX ADMIN — METOCLOPRAMIDE HYDROCHLORIDE 5 MG: 5 SOLUTION ORAL at 15:32

## 2020-01-05 RX ADMIN — GUAIFENESIN 100 MG: 200 SOLUTION ORAL at 21:22

## 2020-01-05 RX ADMIN — MUPIROCIN: 20 OINTMENT TOPICAL at 08:22

## 2020-01-05 RX ADMIN — METOCLOPRAMIDE HYDROCHLORIDE 5 MG: 5 SOLUTION ORAL at 21:22

## 2020-01-05 RX ADMIN — LACTULOSE 20 G: 20 SOLUTION ORAL at 15:32

## 2020-01-05 RX ADMIN — Medication 40 MG: at 08:35

## 2020-01-05 RX ADMIN — IPRATROPIUM BROMIDE AND ALBUTEROL SULFATE 1 AMPULE: 2.5; .5 SOLUTION RESPIRATORY (INHALATION) at 15:48

## 2020-01-05 RX ADMIN — ENOXAPARIN SODIUM 40 MG: 40 INJECTION SUBCUTANEOUS at 08:22

## 2020-01-05 RX ADMIN — DOXYCYCLINE 100 MG: 100 INJECTION, POWDER, LYOPHILIZED, FOR SOLUTION INTRAVENOUS at 15:31

## 2020-01-05 RX ADMIN — SENNOSIDES AND DOCUSATE SODIUM 2 TABLET: 8.6; 5 TABLET ORAL at 08:22

## 2020-01-05 RX ADMIN — LACTULOSE 20 G: 20 SOLUTION ORAL at 21:22

## 2020-01-05 RX ADMIN — IPRATROPIUM BROMIDE AND ALBUTEROL SULFATE 1 AMPULE: 2.5; .5 SOLUTION RESPIRATORY (INHALATION) at 11:38

## 2020-01-05 RX ADMIN — MUPIROCIN: 20 OINTMENT TOPICAL at 15:31

## 2020-01-05 RX ADMIN — CLOTRIMAZOLE AND BETAMETHASONE DIPROPIONATE: 10; .5 CREAM TOPICAL at 08:22

## 2020-01-05 RX ADMIN — ONDANSETRON 4 MG: 2 INJECTION INTRAMUSCULAR; INTRAVENOUS at 15:32

## 2020-01-05 RX ADMIN — IPRATROPIUM BROMIDE AND ALBUTEROL SULFATE 1 AMPULE: 2.5; .5 SOLUTION RESPIRATORY (INHALATION) at 07:53

## 2020-01-05 RX ADMIN — POLYETHYLENE GLYCOL 3350 17 G: 17 POWDER, FOR SOLUTION ORAL at 08:22

## 2020-01-05 RX ADMIN — LACTULOSE 20 G: 20 SOLUTION ORAL at 08:21

## 2020-01-05 RX ADMIN — CEFTRIAXONE SODIUM 1 G: 1 INJECTION, POWDER, FOR SOLUTION INTRAMUSCULAR; INTRAVENOUS at 02:32

## 2020-01-05 RX ADMIN — METOCLOPRAMIDE HYDROCHLORIDE 5 MG: 5 SOLUTION ORAL at 08:22

## 2020-01-05 ASSESSMENT — PAIN SCALES - GENERAL: PAINLEVEL_OUTOF10: 0

## 2020-01-05 NOTE — PROGRESS NOTES
Kelsie Castano is a 22 y.o. male patient. Patient resting comfortably.     Current Facility-Administered Medications   Medication Dose Route Frequency Provider Last Rate Last Dose    dextrose 5 % and 0.45 % sodium chloride infusion   Intravenous Continuous Thaddeus Cavazos  mL/hr at 01/05/20 0233      lactulose (CHRONULAC) 10 GM/15ML solution 20 g  20 g Oral TID Nicolasa Infante MD   20 g at 01/04/20 2303    acetaminophen (TYLENOL) 160 MG/5ML solution 650 mg  650 mg PEG Tube Q4H PRN Thaddeus Cavazos MD        bismuth subsalicylate (PEPTO BISMOL) 262 MG/15ML suspension 15 mL  15 mL Per G Tube Q4H PRN Thaddues Cavazos MD        clotrimazole-betamethasone (LOTRISONE) cream   Topical BID Thaddeus Cavazos MD        fluticasone [facility]) 50 MCG/ACT nasal spray 1 spray  1 spray Each Nostril PRN Thaddeus Cavazos MD        guaiFENesin (ROBITUSSIN) 100 MG/5ML oral solution 100 mg  100 mg Oral Q4H PRN Thaddeus Cavazos MD   100 mg at 01/03/20 1638    ibuprofen (ADVIL;MOTRIN) 100 MG/5ML suspension 400 mg  400 mg Per G Tube Q4H PRN Thaddeus Cavazos MD        loperamide (IMODIUM) 1 MG/7.5ML solution 2 mg  2 mg Oral PRN Thaddeus Cavazos MD        magnesium hydroxide (MILK OF MAGNESIA) 400 MG/5ML suspension 15 mL  15 mL Per G Tube Daily PRN Thaddeus Cavazos MD        metoclopramide [facility]) 5 MG/5ML solution 5 mg  5 mg PEG Tube TID Thaddeus Cavazos MD   5 mg at 01/04/20 2303    famotidine (PEPCID) suspension 40 mg  40 mg Per G Tube Daily Thaddeus Cavazos MD   40 mg at 01/04/20 0800    zinc oxide 20 % ointment   Topical PRN Thaddeus Cavazos MD        enoxaparin (LOVENOX) injection 40 mg  40 mg Subcutaneous Daily Thaddeus Cavazos MD   40 mg at 01/04/20 0925    doxycycline (VIBRAMYCIN) 100 mg in dextrose 5 % 100 mL IVPB  100 mg Intravenous Q12H Thaddeus Cavazos MD   Stopped at 01/05/20 0982    cefTRIAXone (ROCEPHIN) 1 g in sterile water 10 mL IV syringe  1 g Intravenous Q24H Shirlee Sandifer, MD   1 g at 01/05/20 0232    promethazine-dextromethorphan (PROMETHAZINE-DM) 6.25-15 MG/5ML syrup 5 mL  5 mL Per G Tube Q6H PRN Shirlee Sandifer, MD        mupirocin [facility]) 2 % ointment   Topical TID Shirlee Sandifer, MD        sennosides-docusate sodium (SENOKOT-S) 8.6-50 MG tablet 2 tablet  2 tablet Oral Daily Shena Sanchez MD   2 tablet at 01/04/20 0800    polyethylene glycol (GLYCOLAX) packet 17 g  17 g Oral Daily Shena Sanchez MD   17 g at 01/04/20 0800    ipratropium-albuterol (DUONEB) nebulizer solution 1 ampule  1 ampule Inhalation Q4H WA Shirlee Sandifer, MD   1 ampule at 01/05/20 0753     Allergies   Allergen Reactions    Seasonal Itching and Other (See Comments)     Eye drainage  Eye drainage  Nasal congestion.  Valium [Diazepam] Other (See Comments)     He received 1 mg and became obtunded and hypo-ventated, He would be better to use versed for spasticity. Per [employer]. Active Problems:    Pneumonia due to organism    Shortness of breath    Ileus ([address]  Resolved Problems:    * No resolved hospital problems. *    Blood pressure 122/86, pulse 109, temperature 97.3 °F (36.3 °C), temperature source Temporal, resp. rate 18, height 5' 4\" (1.626 m), weight 106 lb (48.1 kg), SpO2 97 %. Subjective:  Symptoms:  Stable. Diet:  Adequate intake. Activity level: Normal.    Pain:  He reports no pain. Objective:  General Appearance:  Comfortable. Vital signs: (most recent): Blood pressure 122/86, pulse 109, temperature 97.3 °F (36.3 °C), temperature source Temporal, resp. rate 18, height 5' 4\" (1.626 m), weight 106 lb (48.1 kg), SpO2 97 %. No fever. Lungs: There are decreased breath sounds. Assessment:  (URI/ possible pneumonia  Ileus  CP). Plan:   (Surgery ok to resume diet. Tube feels per dietary  Continue Abx. Monitor labs and cultures. May need placement).        David Shirley Lucas Seymour MD  1/5/2020

## 2020-01-05 NOTE — PLAN OF CARE
Problem: Risk for Impaired Skin Integrity  Goal: Tissue integrity - skin and mucous membranes  Description  Structural intactness and normal physiological function of skin and  mucous membranes.   1/5/2020 0546 by Savita Virgen RN  Outcome: Met This Shift     Problem: Falls - Risk of:  Goal: Will remain free from falls  Description  Will remain free from falls  1/5/2020 0546 by Savita Virgen RN  Outcome: Met This Shift     Problem: Falls - Risk of:  Goal: Absence of physical injury  Description  Absence of physical injury  1/5/2020 0546 by Savita Virgen RN  Outcome: Met This Shift

## 2020-01-06 LAB
ANION GAP SERPL CALCULATED.3IONS-SCNC: 12 MMOL/L (ref 7–16)
BUN BLDV-MCNC: 5 MG/DL (ref 6–20)
CALCIUM SERPL-MCNC: 9.1 MG/DL (ref 8.6–10.2)
CHLORIDE BLD-SCNC: 102 MMOL/L (ref 98–107)
CO2: 25 MMOL/L (ref 22–29)
CREAT SERPL-MCNC: 0.3 MG/DL (ref 0.7–1.2)
GFR AFRICAN AMERICAN: >60
GFR NON-AFRICAN AMERICAN: >60 ML/MIN/1.73
GLUCOSE BLD-MCNC: 90 MG/DL (ref 74–99)
HCT VFR BLD CALC: 39.1 % (ref 37–54)
HEMOGLOBIN: 12.8 G/DL (ref 12.5–16.5)
MCH RBC QN AUTO: 31 PG (ref 26–35)
MCHC RBC AUTO-ENTMCNC: 32.7 % (ref 32–34.5)
MCV RBC AUTO: 94.7 FL (ref 80–99.9)
PDW BLD-RTO: 12.3 FL (ref 11.5–15)
PLATELET # BLD: 204 E9/L (ref 130–450)
PMV BLD AUTO: 11 FL (ref 7–12)
POTASSIUM SERPL-SCNC: 3.6 MMOL/L (ref 3.5–5)
RBC # BLD: 4.13 E12/L (ref 3.8–5.8)
SODIUM BLD-SCNC: 139 MMOL/L (ref 132–146)
WBC # BLD: 6.2 E9/L (ref 4.5–11.5)

## 2020-01-06 PROCEDURE — 6370000000 HC RX 637 (ALT 250 FOR IP): Performed by: FAMILY MEDICINE

## 2020-01-06 PROCEDURE — G0378 HOSPITAL OBSERVATION PER HR: HCPCS

## 2020-01-06 PROCEDURE — 2580000003 HC RX 258: Performed by: FAMILY MEDICINE

## 2020-01-06 PROCEDURE — 2500000003 HC RX 250 WO HCPCS: Performed by: FAMILY MEDICINE

## 2020-01-06 PROCEDURE — 36415 COLL VENOUS BLD VENIPUNCTURE: CPT

## 2020-01-06 PROCEDURE — 6370000000 HC RX 637 (ALT 250 FOR IP): Performed by: SURGERY

## 2020-01-06 PROCEDURE — 85027 COMPLETE CBC AUTOMATED: CPT

## 2020-01-06 PROCEDURE — 80048 BASIC METABOLIC PNL TOTAL CA: CPT

## 2020-01-06 PROCEDURE — 6360000002 HC RX W HCPCS: Performed by: FAMILY MEDICINE

## 2020-01-06 PROCEDURE — 94640 AIRWAY INHALATION TREATMENT: CPT

## 2020-01-06 RX ORDER — LACTULOSE 10 G/15ML
20 SOLUTION ORAL 3 TIMES DAILY
Qty: 1 BOTTLE | Refills: 1 | Status: SHIPPED | OUTPATIENT
Start: 2020-01-06

## 2020-01-06 RX ORDER — LACTULOSE 10 G/15ML
20 SOLUTION ORAL 2 TIMES DAILY
Status: DISCONTINUED | OUTPATIENT
Start: 2020-01-07 | End: 2020-01-09 | Stop reason: HOSPADM

## 2020-01-06 RX ORDER — POLYETHYLENE GLYCOL 3350 17 G/17G
17 POWDER, FOR SOLUTION ORAL DAILY
Qty: 527 G | Refills: 1 | Status: SHIPPED | OUTPATIENT
Start: 2020-01-07 | End: 2020-02-06

## 2020-01-06 RX ORDER — SENNA AND DOCUSATE SODIUM 50; 8.6 MG/1; MG/1
2 TABLET, FILM COATED ORAL DAILY
Qty: 60 TABLET | Refills: 1 | Status: SHIPPED | OUTPATIENT
Start: 2020-01-07

## 2020-01-06 RX ORDER — DOXYCYCLINE HYCLATE 100 MG
100 TABLET ORAL 2 TIMES DAILY
Qty: 14 TABLET | Refills: 0 | Status: SHIPPED | OUTPATIENT
Start: 2020-01-06 | End: 2020-01-13

## 2020-01-06 RX ADMIN — SENNOSIDES AND DOCUSATE SODIUM 2 TABLET: 8.6; 5 TABLET ORAL at 08:27

## 2020-01-06 RX ADMIN — DOXYCYCLINE 100 MG: 100 INJECTION, POWDER, LYOPHILIZED, FOR SOLUTION INTRAVENOUS at 14:34

## 2020-01-06 RX ADMIN — MUPIROCIN: 20 OINTMENT TOPICAL at 22:51

## 2020-01-06 RX ADMIN — IPRATROPIUM BROMIDE AND ALBUTEROL SULFATE 1 AMPULE: 2.5; .5 SOLUTION RESPIRATORY (INHALATION) at 07:54

## 2020-01-06 RX ADMIN — Medication 40 MG: at 08:27

## 2020-01-06 RX ADMIN — ENOXAPARIN SODIUM 40 MG: 40 INJECTION SUBCUTANEOUS at 08:28

## 2020-01-06 RX ADMIN — METOCLOPRAMIDE HYDROCHLORIDE 5 MG: 5 SOLUTION ORAL at 14:36

## 2020-01-06 RX ADMIN — GUAIFENESIN 100 MG: 200 SOLUTION ORAL at 18:01

## 2020-01-06 RX ADMIN — DOXYCYCLINE 100 MG: 100 INJECTION, POWDER, LYOPHILIZED, FOR SOLUTION INTRAVENOUS at 02:48

## 2020-01-06 RX ADMIN — IPRATROPIUM BROMIDE AND ALBUTEROL SULFATE 1 AMPULE: 2.5; .5 SOLUTION RESPIRATORY (INHALATION) at 16:39

## 2020-01-06 RX ADMIN — POLYETHYLENE GLYCOL 3350 17 G: 17 POWDER, FOR SOLUTION ORAL at 08:27

## 2020-01-06 RX ADMIN — METOCLOPRAMIDE HYDROCHLORIDE 5 MG: 5 SOLUTION ORAL at 08:27

## 2020-01-06 RX ADMIN — IPRATROPIUM BROMIDE AND ALBUTEROL SULFATE 1 AMPULE: 2.5; .5 SOLUTION RESPIRATORY (INHALATION) at 12:25

## 2020-01-06 RX ADMIN — DEXTROSE AND SODIUM CHLORIDE: 5; 450 INJECTION, SOLUTION INTRAVENOUS at 14:34

## 2020-01-06 RX ADMIN — CEFTRIAXONE SODIUM 1 G: 1 INJECTION, POWDER, FOR SOLUTION INTRAMUSCULAR; INTRAVENOUS at 02:44

## 2020-01-06 RX ADMIN — IPRATROPIUM BROMIDE AND ALBUTEROL SULFATE 1 AMPULE: 2.5; .5 SOLUTION RESPIRATORY (INHALATION) at 20:06

## 2020-01-06 RX ADMIN — DEXTROSE AND SODIUM CHLORIDE: 5; 450 INJECTION, SOLUTION INTRAVENOUS at 23:11

## 2020-01-06 RX ADMIN — METOCLOPRAMIDE HYDROCHLORIDE 5 MG: 5 SOLUTION ORAL at 22:51

## 2020-01-06 RX ADMIN — MUPIROCIN: 20 OINTMENT TOPICAL at 08:27

## 2020-01-06 RX ADMIN — CLOTRIMAZOLE AND BETAMETHASONE DIPROPIONATE: 10; .5 CREAM TOPICAL at 08:27

## 2020-01-06 RX ADMIN — LACTULOSE 20 G: 20 SOLUTION ORAL at 14:36

## 2020-01-06 RX ADMIN — LACTULOSE 20 G: 20 SOLUTION ORAL at 08:27

## 2020-01-06 RX ADMIN — MUPIROCIN: 20 OINTMENT TOPICAL at 14:34

## 2020-01-06 RX ADMIN — CLOTRIMAZOLE AND BETAMETHASONE DIPROPIONATE: 10; .5 CREAM TOPICAL at 22:51

## 2020-01-06 ASSESSMENT — PAIN SCALES - GENERAL
PAINLEVEL_OUTOF10: 0

## 2020-01-06 NOTE — PROGRESS NOTES
Zaki Shook is a 22 y.o. male patient. Patient resting comfortably.     Current Facility-Administered Medications   Medication Dose Route Frequency Provider Last Rate Last Dose    ondansetron (ZOFRAN) injection 4 mg  4 mg Intravenous Q4H PRN Samantha Villaseñor MD   4 mg at 01/05/20 1532    dextrose 5 % and 0.45 % sodium chloride infusion   Intravenous Continuous Samantha Villaseñor  mL/hr at 01/05/20 0233      lactulose (CHRONULAC) 10 GM/15ML solution 20 g  20 g Oral TID Uche Alonso MD   20 g at 01/05/20 2122    acetaminophen (TYLENOL) 160 MG/5ML solution 650 mg  650 mg PEG Tube Q4H PRN Samantha Villaseñor MD        bismuth subsalicylate (PEPTO BISMOL) 262 MG/15ML suspension 15 mL  15 mL Per G Tube Q4H PRN Samantha Villaseñor MD        clotrimazole-betamethasone (LOTRISONE) cream   Topical BID Samantha Villaseñor MD        fluticasone [facility]) 50 MCG/ACT nasal spray 1 spray  1 spray Each Nostril PRN Samantha Villaseñor MD        guaiFENesin (ROBITUSSIN) 100 MG/5ML oral solution 100 mg  100 mg Oral Q4H PRN Samantha Villaseñor MD   100 mg at 01/05/20 2122    ibuprofen (ADVIL;MOTRIN) 100 MG/5ML suspension 400 mg  400 mg Per G Tube Q4H PRN Samantha Villaseñor MD        loperamide (IMODIUM) 1 MG/7.5ML solution 2 mg  2 mg Oral PRN Samantha Villaseñor MD        magnesium hydroxide (MILK OF MAGNESIA) 400 MG/5ML suspension 15 mL  15 mL Per G Tube Daily PRN Samantha Villaseñor MD        metoclopramide [facility]) 5 MG/5ML solution 5 mg  5 mg PEG Tube TID Samantha Villaseñor MD   5 mg at 01/05/20 2122    famotidine (PEPCID) suspension 40 mg  40 mg Per G Tube Daily Samantha Villaseñor MD   40 mg at 01/05/20 3762    zinc oxide 20 % ointment   Topical PRN Samantha Villaseñor MD        enoxaparin (LOVENOX) injection 40 mg  40 mg Subcutaneous Daily Samantha Villaseñor MD   40 mg at 01/05/20 4433    doxycycline (VIBRAMYCIN) 100 mg in dextrose 5 % 100 mL IVPB 100 mg Intravenous Q12H Raven Dudley MD   Stopped at 01/06/20 0400    cefTRIAXone (ROCEPHIN) 1 g in sterile water 10 mL IV syringe  1 g Intravenous Q24H Raven Dudley MD   1 g at 01/06/20 0244    promethazine-dextromethorphan (PROMETHAZINE-DM) 6.25-15 MG/5ML syrup 5 mL  5 mL Per G Tube Q6H PRN Raven Dudley MD        mupirocin [facility]) 2 % ointment   Topical TID Raven Dudley MD        sennosides-docusate sodium (SENOKOT-S) 8.6-50 MG tablet 2 tablet  2 tablet Oral Daily Ed MD Sherry   2 tablet at 01/05/20 8527    polyethylene glycol (GLYCOLAX) packet 17 g  17 g Oral Daily Ed MD Sherry   17 g at 01/05/20 1306    ipratropium-albuterol (DUONEB) nebulizer solution 1 ampule  1 ampule Inhalation Q4H WA Raven Dudley MD   1 ampule at 01/05/20 2005     Allergies   Allergen Reactions    Seasonal Itching and Other (See Comments)     Eye drainage  Eye drainage  Nasal congestion.  Valium [Diazepam] Other (See Comments)     He received 1 mg and became obtunded and hypo-ventated, He would be better to use versed for spasticity. Per [employer]. Active Problems:    Pneumonia due to organism    Shortness of breath    Ileus ([address]  Resolved Problems:    * No resolved hospital problems. *    Blood pressure 117/65, pulse 110, temperature 97.8 °F (36.6 °C), temperature source Temporal, resp. rate 17, height 5' 4\" (1.626 m), weight 106 lb (48.1 kg), SpO2 97 %. Subjective:  Symptoms:  Stable. Diet:  Adequate intake. Activity level: Normal.    Pain:  He reports no pain. Objective:  General Appearance:  Comfortable. Vital signs: (most recent): Blood pressure 117/65, pulse 110, temperature 97.8 °F (36.6 °C), temperature source Temporal, resp. rate 17, height 5' 4\" (1.626 m), weight 106 lb (48.1 kg), SpO2 97 %. No fever. Lungs: There are decreased breath sounds. Assessment:  (URI/ possible pneumonia  Ileus  CP).      Plan:   (Surgery ok to resume diet.  Tube feels per dietary, currently on  Hold. Surgery aware. Continue Abx. Monitor labs and cultures. May need placement).        Samantha Villaseñor MD  1/6/2020

## 2020-01-06 NOTE — DISCHARGE SUMMARY
Physician Discharge Summary     Patient ID:  Darin Jessica  [id number]  16 y.o.  2000    Admit date: 1/2/2020    Discharge date and time: 1/6/20    Admitting Physician: Lilia Ford MD     Discharge Physician: Keith Yan MD    Admission Diagnoses: Pneumonia [J18.9]  Pneumonia [J18.9]  Shortness of breath [R06.02]    Discharge Diagnoses: Pneumonia, likely viral    Admission Condition: fair    Discharged Condition: stable    Indication for Admission: URI, failed outpatient Abx. Improved with nebs, doxy rocephin. Surgery also followed for possible ileus. Resolved. Sent back to facility. Hospital Course: see above    Consults: Surgery    Significant Diagnostic Studies: labs, cultures    Treatments: as above    Discharge Exam:  See note    Disposition: long term care facility    In process/preliminary results:  Outstanding Order Results     Date and Time Order Name Status Description    1/2/2020 2239 Culture Blood #2 Preliminary     1/2/2020 2210 Culture Blood #1 Preliminary           Patient Instructions:   Current Discharge Medication List      START taking these medications    Details   mupirocin (BACTROBAN) 2 % ointment Apply topically 3 times daily. Qty: 1 Tube, Refills: 1      lactulose (CHRONULAC) 10 GM/15ML solution Take 30 mLs by mouth 3 times daily  Qty: 1 Bottle, Refills: 1      polyethylene glycol (GLYCOLAX) packet Take 17 g by mouth daily  Qty: 527 g, Refills: 1      sennosides-docusate sodium (SENOKOT-S) 8.6-50 MG tablet Take 2 tablets by mouth daily  Qty: 60 tablet, Refills: 1      doxycycline hyclate (VIBRA-TABS) 100 MG tablet Take 1 tablet by mouth 2 times daily for 7 days  Qty: 14 tablet, Refills: 0         CONTINUE these medications which have NOT CHANGED    Details   guaiFENesin (ROBITUSSIN) 100 MG/5ML SOLN oral solution Take 100 mg by mouth every 4 hours as needed for Cough Peg tube      mupirocin (BACTROBAN) 2 % cream Apply topically 3 times daily.   Qty: 1 Tube, Refills: 0

## 2020-01-06 NOTE — DISCHARGE INSTR - COC
Dependent  Transfer  Dependent  Bathing  Dependent  Dressing  Dependent  Toileting  Dependent  Feeding  Dependent  Med Admin  Dependent  Med Delivery   through ariane-key button    Wound Care Documentation and Therapy:        Elimination:  Continence:   · Bowel: No  · Bladder: No  Urinary Catheter: None   Colostomy/Ileostomy/Ileal Conduit: No       Date of Last BM: 1.6.20    Intake/Output Summary (Last 24 hours) at 1/6/2020 1018  Last data filed at 1/6/2020 0518  Gross per 24 hour   Intake 1471.67 ml   Output 0 ml   Net 1471.67 ml     I/O last 3 completed shifts: In: 1471.7 [I.V.:951.7; NG/GT:520]  Out: 0     Safety Concerns: At Risk for Falls and Aspiration Risk    Impairments/Disabilities:      Speech and Contractures - ***    Nutrition Therapy:  Current Nutrition Therapy:   - Tube Feedings:  Semi-elemental    Routes of Feeding: Gastrostomy Tube  Liquids: No Liquids  Daily Fluid Restriction: no  Last Modified Barium Swallow with Video (Video Swallowing Test): not done    Treatments at the Time of Hospital Discharge:   Respiratory Treatments: Duonebs Q4  Oxygen Therapy:  is not on home oxygen therapy.   Ventilator:    - No ventilator support    Rehab Therapies:   Weight Bearing Status/Restrictions: No weight bearing restirctions  Other Medical Equipment (for information only, NOT a DME order):  hospital bed  Other Treatments: ***    Patient's personal belongings (please select all that are sent with patient):  None    RN SIGNATURE:  Electronically signed by Suad Hsieh RN on 1/6/20 at 10:50 AM    CASE MANAGEMENT/SOCIAL WORK SECTION    Inpatient Status Date: ***    Readmission Risk Assessment Score:  Readmission Risk              Risk of Unplanned Readmission:        13           Discharging to Facility/ Agency   · Name: [facility]  · Address:  · Phone:  · Fax:    Dialysis Facility (if applicable)   · Name:  · Address:  · Dialysis Schedule:  · Phone:  · Fax:    / signature:

## 2020-01-07 LAB
ANION GAP SERPL CALCULATED.3IONS-SCNC: 14 MMOL/L (ref 7–16)
BUN BLDV-MCNC: 6 MG/DL (ref 6–20)
CALCIUM SERPL-MCNC: 9.2 MG/DL (ref 8.6–10.2)
CHLORIDE BLD-SCNC: 105 MMOL/L (ref 98–107)
CO2: 24 MMOL/L (ref 22–29)
CREAT SERPL-MCNC: 0.3 MG/DL (ref 0.7–1.2)
GFR AFRICAN AMERICAN: >60
GFR NON-AFRICAN AMERICAN: >60 ML/MIN/1.73
GLUCOSE BLD-MCNC: 115 MG/DL (ref 74–99)
HCT VFR BLD CALC: 40 % (ref 37–54)
HEMOGLOBIN: 13.2 G/DL (ref 12.5–16.5)
MCH RBC QN AUTO: 31.4 PG (ref 26–35)
MCHC RBC AUTO-ENTMCNC: 33 % (ref 32–34.5)
MCV RBC AUTO: 95 FL (ref 80–99.9)
PDW BLD-RTO: 12.4 FL (ref 11.5–15)
PLATELET # BLD: 184 E9/L (ref 130–450)
PMV BLD AUTO: 10.4 FL (ref 7–12)
POTASSIUM SERPL-SCNC: 3.6 MMOL/L (ref 3.5–5)
RBC # BLD: 4.21 E12/L (ref 3.8–5.8)
SODIUM BLD-SCNC: 143 MMOL/L (ref 132–146)
WBC # BLD: 7 E9/L (ref 4.5–11.5)

## 2020-01-07 PROCEDURE — 6370000000 HC RX 637 (ALT 250 FOR IP): Performed by: FAMILY MEDICINE

## 2020-01-07 PROCEDURE — 94640 AIRWAY INHALATION TREATMENT: CPT

## 2020-01-07 PROCEDURE — 6360000002 HC RX W HCPCS: Performed by: FAMILY MEDICINE

## 2020-01-07 PROCEDURE — G0378 HOSPITAL OBSERVATION PER HR: HCPCS

## 2020-01-07 PROCEDURE — 85027 COMPLETE CBC AUTOMATED: CPT

## 2020-01-07 PROCEDURE — 6370000000 HC RX 637 (ALT 250 FOR IP): Performed by: SURGERY

## 2020-01-07 PROCEDURE — 36415 COLL VENOUS BLD VENIPUNCTURE: CPT

## 2020-01-07 PROCEDURE — 2580000003 HC RX 258: Performed by: FAMILY MEDICINE

## 2020-01-07 PROCEDURE — 80048 BASIC METABOLIC PNL TOTAL CA: CPT

## 2020-01-07 PROCEDURE — 2500000003 HC RX 250 WO HCPCS: Performed by: FAMILY MEDICINE

## 2020-01-07 RX ADMIN — DOXYCYCLINE 100 MG: 100 INJECTION, POWDER, LYOPHILIZED, FOR SOLUTION INTRAVENOUS at 03:07

## 2020-01-07 RX ADMIN — MUPIROCIN: 20 OINTMENT TOPICAL at 14:25

## 2020-01-07 RX ADMIN — LACTULOSE 20 G: 20 SOLUTION ORAL at 08:12

## 2020-01-07 RX ADMIN — METOCLOPRAMIDE HYDROCHLORIDE 5 MG: 5 SOLUTION ORAL at 20:56

## 2020-01-07 RX ADMIN — IPRATROPIUM BROMIDE AND ALBUTEROL SULFATE 1 AMPULE: 2.5; .5 SOLUTION RESPIRATORY (INHALATION) at 16:10

## 2020-01-07 RX ADMIN — GUAIFENESIN 100 MG: 200 SOLUTION ORAL at 20:59

## 2020-01-07 RX ADMIN — IPRATROPIUM BROMIDE AND ALBUTEROL SULFATE 1 AMPULE: 2.5; .5 SOLUTION RESPIRATORY (INHALATION) at 11:54

## 2020-01-07 RX ADMIN — Medication 40 MG: at 08:14

## 2020-01-07 RX ADMIN — ENOXAPARIN SODIUM 40 MG: 40 INJECTION SUBCUTANEOUS at 08:12

## 2020-01-07 RX ADMIN — DEXTROSE AND SODIUM CHLORIDE: 5; 450 INJECTION, SOLUTION INTRAVENOUS at 15:41

## 2020-01-07 RX ADMIN — IPRATROPIUM BROMIDE AND ALBUTEROL SULFATE 1 AMPULE: 2.5; .5 SOLUTION RESPIRATORY (INHALATION) at 07:54

## 2020-01-07 RX ADMIN — DEXTROSE AND SODIUM CHLORIDE: 5; 450 INJECTION, SOLUTION INTRAVENOUS at 08:13

## 2020-01-07 RX ADMIN — CLOTRIMAZOLE AND BETAMETHASONE DIPROPIONATE: 10; .5 CREAM TOPICAL at 20:56

## 2020-01-07 RX ADMIN — METOCLOPRAMIDE HYDROCHLORIDE 5 MG: 5 SOLUTION ORAL at 14:25

## 2020-01-07 RX ADMIN — SENNOSIDES AND DOCUSATE SODIUM 2 TABLET: 8.6; 5 TABLET ORAL at 08:12

## 2020-01-07 RX ADMIN — LACTULOSE 20 G: 20 SOLUTION ORAL at 20:56

## 2020-01-07 RX ADMIN — METOCLOPRAMIDE HYDROCHLORIDE 5 MG: 5 SOLUTION ORAL at 08:12

## 2020-01-07 RX ADMIN — MUPIROCIN: 20 OINTMENT TOPICAL at 20:56

## 2020-01-07 RX ADMIN — CEFTRIAXONE SODIUM 1 G: 1 INJECTION, POWDER, FOR SOLUTION INTRAMUSCULAR; INTRAVENOUS at 01:45

## 2020-01-07 RX ADMIN — IPRATROPIUM BROMIDE AND ALBUTEROL SULFATE 1 AMPULE: 2.5; .5 SOLUTION RESPIRATORY (INHALATION) at 19:55

## 2020-01-07 RX ADMIN — DOXYCYCLINE 100 MG: 100 INJECTION, POWDER, LYOPHILIZED, FOR SOLUTION INTRAVENOUS at 15:38

## 2020-01-07 RX ADMIN — MUPIROCIN: 20 OINTMENT TOPICAL at 08:13

## 2020-01-07 RX ADMIN — CLOTRIMAZOLE AND BETAMETHASONE DIPROPIONATE: 10; .5 CREAM TOPICAL at 08:13

## 2020-01-07 RX ADMIN — POLYETHYLENE GLYCOL 3350 17 G: 17 POWDER, FOR SOLUTION ORAL at 08:12

## 2020-01-07 ASSESSMENT — PAIN SCALES - GENERAL: PAINLEVEL_OUTOF10: 0

## 2020-01-07 NOTE — PROGRESS NOTES
Paulo Starkey is a 22 y.o. male patient. Patient resting comfortably.     Current Facility-Administered Medications   Medication Dose Route Frequency Provider Last Rate Last Dose    [START ON 1/7/2020] lactulose (CHRONULAC) 10 GM/15ML solution 20 g  20 g Oral BID Princess Nayak MD        ondansetron [facility]) injection 4 mg  4 mg Intravenous Q4H PRN Princess Nayak MD   4 mg at 01/05/20 1532    dextrose 5 % and 0.45 % sodium chloride infusion   Intravenous Continuous Princess Nayak  mL/hr at 01/06/20 1434      acetaminophen (TYLENOL) 160 MG/5ML solution 650 mg  650 mg PEG Tube Q4H PRN Princess Nayak MD        bismuth subsalicylate (PEPTO BISMOL) 262 MG/15ML suspension 15 mL  15 mL Per G Tube Q4H PRN Princess Nayak MD        clotrimazole-betamethasone (LOTRISONE) cream   Topical BID Princess Nayak MD        fluticasone [facility]) 50 MCG/ACT nasal spray 1 spray  1 spray Each Nostril PRN Princess Nayak MD        guaiFENesin (ROBITUSSIN) 100 MG/5ML oral solution 100 mg  100 mg Oral Q4H PRN Princess Nayak MD   100 mg at 01/06/20 1801    ibuprofen (ADVIL;MOTRIN) 100 MG/5ML suspension 400 mg  400 mg Per G Tube Q4H PRN Princess Nayak MD        loperamide (IMODIUM) 1 MG/7.5ML solution 2 mg  2 mg Oral PRN Princess Nayak MD        magnesium hydroxide (MILK OF MAGNESIA) 400 MG/5ML suspension 15 mL  15 mL Per G Tube Daily PRN Princess Nayak MD        metoclopramide [facility]) 5 MG/5ML solution 5 mg  5 mg PEG Tube TID Princess Nayak MD   5 mg at 01/06/20 2251    famotidine (PEPCID) suspension 40 mg  40 mg Per G Tube Daily Princess Nayak MD   40 mg at 01/06/20 0827    zinc oxide 20 % ointment   Topical PRN Princess Nayak MD        enoxaparin (LOVENOX) injection 40 mg  40 mg Subcutaneous Daily Princess Nayak MD   40 mg at 01/06/20 0842    doxycycline (VIBRAMYCIN) 100 mg in dextrose 5 % 100 mL resume diet. Tube feels per dietary, currently on  Hold. Surgery aware. Continue Abx. Monitor labs and cultures. May need placement).        Nita Arteaga MD  1/6/2020

## 2020-01-07 NOTE — PROGRESS NOTES
Kathy Mclean is a 22 y.o. male patient. Patient resting comfortably.     Current Facility-Administered Medications   Medication Dose Route Frequency Provider Last Rate Last Dose    lactulose (CHRONULAC) 10 GM/15ML solution 20 g  20 g Oral BID Sherwin Michael MD        [facility]dansetron [facility]) injection 4 mg  4 mg Intravenous Q4H PRN Sherwin Michael MD   4 mg at 01/05/20 1532    dextrose 5 % and 0.45 % sodium chloride infusion   Intravenous Continuous Sherwin Michael  mL/hr at 01/06/20 2311      acetaminophen (TYLENOL) 160 MG/5ML solution 650 mg  650 mg PEG Tube Q4H PRN Sherwin Michael MD        bismuth subsalicylate (PEPTO BISMOL) 262 MG/15ML suspension 15 mL  15 mL Per G Tube Q4H PRN Sherwin Michael MD        clotrimazole-betamethasone (LOTRISONE) cream   Topical BID Sherwin Michael MD        fluticasone [facility]) 50 MCG/ACT nasal spray 1 spray  1 spray Each Nostril PRN Sherwin Michael MD        guaiFENesin (ROBITUSSIN) 100 MG/5ML oral solution 100 mg  100 mg Oral Q4H PRN Sherwin Michael MD   100 mg at 01/06/20 1801    ibuprofen (ADVIL;MOTRIN) 100 MG/5ML suspension 400 mg  400 mg Per G Tube Q4H PRN Sherwin Michael MD        loperamide (IMODIUM) 1 MG/7.5ML solution 2 mg  2 mg Oral PRN Sherwin Michael MD        magnesium hydroxide (MILK OF MAGNESIA) 400 MG/5ML suspension 15 mL  15 mL Per G Tube Daily PRN Sherwin Michael MD        metoclopramide [facility]) 5 MG/5ML solution 5 mg  5 mg PEG Tube TID Sherwin Michael MD   5 mg at 01/06/20 2251    famotidine (PEPCID) suspension 40 mg  40 mg Per G Tube Daily Sherwin Michael MD   40 mg at 01/06/20 0827    zinc oxide 20 % ointment   Topical PRN Sherwin Michael MD        enoxaparin (LOVENOX) injection 40 mg  40 mg Subcutaneous Daily Sherwin Michael MD   40 mg at 01/06/20 0828    doxycycline (VIBRAMYCIN) 100 mg in dextrose 5 % 100 mL IVPB  100 mg Intravenous Q12H Le Jackson MD   Stopped at 01/07/20 0407    cefTRIAXone (ROCEPHIN) 1 g in sterile water 10 mL IV syringe  1 g Intravenous Q24H Le Jackson MD   1 g at 01/07/20 0145    promethazine-dextromethorphan (PROMETHAZINE-DM) 6.25-15 MG/5ML syrup 5 mL  5 mL Per G Tube Q6H PRN Le Jackson MD        mupirocin [facility]) 2 % ointment   Topical TID Le Jackson MD        sennosides-docusate sodium (SENOKOT-S) 8.6-50 MG tablet 2 tablet  2 tablet Oral Daily Yarelis Khan MD   2 tablet at 01/06/20 0827    polyethylene glycol (GLYCOLAX) packet 17 g  17 g Oral Daily Yarelis Khan MD   17 g at 01/06/20 0827    ipratropium-albuterol (DUONEB) nebulizer solution 1 ampule  1 ampule Inhalation Q4H WA Le Jackson MD   1 ampule at 01/06/20 2006     Allergies   Allergen Reactions    Seasonal Itching and Other (See Comments)     Eye drainage  Eye drainage  Nasal congestion.  Valium [Diazepam] Other (See Comments)     He received 1 mg and became obtunded and hypo-ventated, He would be better to use versed for spasticity. Per [employer]. Active Problems:    Pneumonia due to organism    Shortness of breath    Ileus ([address]  Resolved Problems:    * No resolved hospital problems. *    Blood pressure 113/68, pulse 109, temperature 98.6 °F (37 °C), temperature source Temporal, resp. rate 18, height 5' 4\" (1.626 m), weight 106 lb (48.1 kg), SpO2 96 %. Subjective:  Symptoms:  Stable. Diet:  Adequate intake. Activity level: Normal.    Pain:  He reports no pain. Objective:  General Appearance:  Comfortable. Vital signs: (most recent): Blood pressure 113/68, pulse 109, temperature 98.6 °F (37 °C), temperature source Temporal, resp. rate 18, height 5' 4\" (1.626 m), weight 106 lb (48.1 kg), SpO2 96 %. No fever. Lungs: There are decreased breath sounds. Assessment:  (URI/ possible pneumonia  Ileus  CP).      Plan:   (Surgery ok to resume

## 2020-01-07 NOTE — PLAN OF CARE
Problem: Risk for Impaired Skin Integrity  Goal: Tissue integrity - skin and mucous membranes  Description  Structural intactness and normal physiological function of skin and  mucous membranes.   1/7/2020 0447 by Landon Sosa RN  Outcome: Met This Shift  1/6/2020 1527 by Claudio Montes RN  Outcome: Met This Shift     Problem: Falls - Risk of:  Goal: Will remain free from falls  Description  Will remain free from falls  1/7/2020 0447 by Landon Sosa RN  Outcome: Met This Shift  1/6/2020 1527 by Claudio Montes RN  Outcome: Met This Shift  Goal: Absence of physical injury  Description  Absence of physical injury  1/7/2020 0447 by Landon Sosa RN  Outcome: Met This Shift  1/6/2020 1527 by Claudio Montes RN  Outcome: Met This Shift

## 2020-01-07 NOTE — CARE COORDINATION
PASSR completed, and pt tripped screen. Awaiting onsite. Plan is [place] [facility]. Legal guardian Rowena Jones) aware. Isiah FERGUSON  Received call from Danii Peralta [phone], she will be the worker for case. Faxed all information that is needed to Rebecca Correa [phone]. Isiah FERGUSON

## 2020-01-08 LAB
ANION GAP SERPL CALCULATED.3IONS-SCNC: 14 MMOL/L (ref 7–16)
BLOOD CULTURE, ROUTINE: NORMAL
BUN BLDV-MCNC: 7 MG/DL (ref 6–20)
CALCIUM SERPL-MCNC: 9.6 MG/DL (ref 8.6–10.2)
CHLORIDE BLD-SCNC: 98 MMOL/L (ref 98–107)
CO2: 23 MMOL/L (ref 22–29)
CREAT SERPL-MCNC: 0.3 MG/DL (ref 0.7–1.2)
CULTURE, BLOOD 2: NORMAL
GFR AFRICAN AMERICAN: >60
GFR NON-AFRICAN AMERICAN: >60 ML/MIN/1.73
GLUCOSE BLD-MCNC: 117 MG/DL (ref 74–99)
HCT VFR BLD CALC: 41.7 % (ref 37–54)
HEMOGLOBIN: 13.6 G/DL (ref 12.5–16.5)
MCH RBC QN AUTO: 31.2 PG (ref 26–35)
MCHC RBC AUTO-ENTMCNC: 32.6 % (ref 32–34.5)
MCV RBC AUTO: 95.6 FL (ref 80–99.9)
PDW BLD-RTO: 12.5 FL (ref 11.5–15)
PLATELET # BLD: 211 E9/L (ref 130–450)
PMV BLD AUTO: 10.8 FL (ref 7–12)
POTASSIUM SERPL-SCNC: 3.5 MMOL/L (ref 3.5–5)
RBC # BLD: 4.36 E12/L (ref 3.8–5.8)
SODIUM BLD-SCNC: 135 MMOL/L (ref 132–146)
WBC # BLD: 8.4 E9/L (ref 4.5–11.5)

## 2020-01-08 PROCEDURE — 80048 BASIC METABOLIC PNL TOTAL CA: CPT

## 2020-01-08 PROCEDURE — G0378 HOSPITAL OBSERVATION PER HR: HCPCS

## 2020-01-08 PROCEDURE — 2580000003 HC RX 258: Performed by: FAMILY MEDICINE

## 2020-01-08 PROCEDURE — 6370000000 HC RX 637 (ALT 250 FOR IP): Performed by: FAMILY MEDICINE

## 2020-01-08 PROCEDURE — 6370000000 HC RX 637 (ALT 250 FOR IP): Performed by: SURGERY

## 2020-01-08 PROCEDURE — 85027 COMPLETE CBC AUTOMATED: CPT

## 2020-01-08 PROCEDURE — 2500000003 HC RX 250 WO HCPCS: Performed by: FAMILY MEDICINE

## 2020-01-08 PROCEDURE — 6360000002 HC RX W HCPCS: Performed by: FAMILY MEDICINE

## 2020-01-08 PROCEDURE — 94640 AIRWAY INHALATION TREATMENT: CPT

## 2020-01-08 PROCEDURE — 36415 COLL VENOUS BLD VENIPUNCTURE: CPT

## 2020-01-08 RX ADMIN — DEXTROSE AND SODIUM CHLORIDE: 5; 450 INJECTION, SOLUTION INTRAVENOUS at 02:36

## 2020-01-08 RX ADMIN — Medication 40 MG: at 10:02

## 2020-01-08 RX ADMIN — CLOTRIMAZOLE AND BETAMETHASONE DIPROPIONATE: 10; .5 CREAM TOPICAL at 08:26

## 2020-01-08 RX ADMIN — MUPIROCIN: 20 OINTMENT TOPICAL at 21:43

## 2020-01-08 RX ADMIN — IPRATROPIUM BROMIDE AND ALBUTEROL SULFATE 1 AMPULE: 2.5; .5 SOLUTION RESPIRATORY (INHALATION) at 23:00

## 2020-01-08 RX ADMIN — DOXYCYCLINE 100 MG: 100 INJECTION, POWDER, LYOPHILIZED, FOR SOLUTION INTRAVENOUS at 14:38

## 2020-01-08 RX ADMIN — POLYETHYLENE GLYCOL 3350 17 G: 17 POWDER, FOR SOLUTION ORAL at 08:26

## 2020-01-08 RX ADMIN — DOXYCYCLINE 100 MG: 100 INJECTION, POWDER, LYOPHILIZED, FOR SOLUTION INTRAVENOUS at 03:02

## 2020-01-08 RX ADMIN — CEFTRIAXONE SODIUM 1 G: 1 INJECTION, POWDER, FOR SOLUTION INTRAMUSCULAR; INTRAVENOUS at 03:02

## 2020-01-08 RX ADMIN — MUPIROCIN: 20 OINTMENT TOPICAL at 08:26

## 2020-01-08 RX ADMIN — METOCLOPRAMIDE HYDROCHLORIDE 5 MG: 5 SOLUTION ORAL at 14:38

## 2020-01-08 RX ADMIN — LACTULOSE 20 G: 20 SOLUTION ORAL at 08:00

## 2020-01-08 RX ADMIN — IPRATROPIUM BROMIDE AND ALBUTEROL SULFATE 1 AMPULE: 2.5; .5 SOLUTION RESPIRATORY (INHALATION) at 12:05

## 2020-01-08 RX ADMIN — IPRATROPIUM BROMIDE AND ALBUTEROL SULFATE 1 AMPULE: 2.5; .5 SOLUTION RESPIRATORY (INHALATION) at 16:15

## 2020-01-08 RX ADMIN — CLOTRIMAZOLE AND BETAMETHASONE DIPROPIONATE: 10; .5 CREAM TOPICAL at 21:43

## 2020-01-08 RX ADMIN — LACTULOSE 20 G: 20 SOLUTION ORAL at 21:43

## 2020-01-08 RX ADMIN — MUPIROCIN: 20 OINTMENT TOPICAL at 14:38

## 2020-01-08 RX ADMIN — IPRATROPIUM BROMIDE AND ALBUTEROL SULFATE 1 AMPULE: 2.5; .5 SOLUTION RESPIRATORY (INHALATION) at 07:56

## 2020-01-08 RX ADMIN — METOCLOPRAMIDE HYDROCHLORIDE 5 MG: 5 SOLUTION ORAL at 08:27

## 2020-01-08 RX ADMIN — SENNOSIDES AND DOCUSATE SODIUM 2 TABLET: 8.6; 5 TABLET ORAL at 08:26

## 2020-01-08 RX ADMIN — ENOXAPARIN SODIUM 40 MG: 40 INJECTION SUBCUTANEOUS at 08:26

## 2020-01-08 RX ADMIN — METOCLOPRAMIDE HYDROCHLORIDE 5 MG: 5 SOLUTION ORAL at 21:42

## 2020-01-08 ASSESSMENT — PAIN SCALES - GENERAL
PAINLEVEL_OUTOF10: 0

## 2020-01-08 NOTE — PROGRESS NOTES
Eliu Lawrence is a 22 y.o. male patient. Patient resting comfortably.     Current Facility-Administered Medications   Medication Dose Route Frequency Provider Last Rate Last Dose    lactulose (CHRONULAC) 10 GM/15ML solution 20 g  20 g Oral BID Nita Arteaga MD   20 g at 01/07/20 2056    ondansetron (ZOFRAN) injection 4 mg  4 mg Intravenous Q4H PRN Nita Arteaga MD   4 mg at 01/05/20 1532    dextrose 5 % and 0.45 % sodium chloride infusion   Intravenous Continuous Nita Arteaga  mL/hr at 01/08/20 0236      acetaminophen (TYLENOL) 160 MG/5ML solution 650 mg  650 mg PEG Tube Q4H PRN Nita Arteaga MD        bismuth subsalicylate (PEPTO BISMOL) 262 MG/15ML suspension 15 mL  15 mL Per G Tube Q4H PRN Nita Arteaga MD        clotrimazole-betamethasone (LOTRISONE) cream   Topical BID Nita Arteaga MD        fluticasone [facility]) 50 MCG/ACT nasal spray 1 spray  1 spray Each Nostril PRN Nita Arteaga MD        guaiFENesin (ROBITUSSIN) 100 MG/5ML oral solution 100 mg  100 mg Oral Q4H PRN Nita Arteaga MD   100 mg at 01/07/20 2059    ibuprofen (ADVIL;MOTRIN) 100 MG/5ML suspension 400 mg  400 mg Per G Tube Q4H PRN Nita Arteaga MD        loperamide (IMODIUM) 1 MG/7.5ML solution 2 mg  2 mg Oral PRN Nita Arteaga MD        magnesium hydroxide (MILK OF MAGNESIA) 400 MG/5ML suspension 15 mL  15 mL Per G Tube Daily PRN Nita Arteaga MD        metoclopramide [facility]) 5 MG/5ML solution 5 mg  5 mg PEG Tube TID Nita Arteaga MD   5 mg at 01/07/20 2056    famotidine (PEPCID) suspension 40 mg  40 mg Per G Tube Daily Nita Arteaga MD   40 mg at 01/07/20 1910    zinc oxide 20 % ointment   Topical PRN Nita Arteaga MD        enoxaparin (LOVENOX) injection 40 mg  40 mg Subcutaneous Daily Nita Arteaga MD   40 mg at 01/07/20 0917    doxycycline (VIBRAMYCIN) 100 mg in dextrose 5 % 100 mL IVPB  100 mg Intravenous Q12H Thaddeus Cavazos MD   Stopped at 01/08/20 0515    cefTRIAXone (ROCEPHIN) 1 g in sterile water 10 mL IV syringe  1 g Intravenous Q24H Thaddeus Cavazos MD   1 g at 01/08/20 0302    promethazine-dextromethorphan (PROMETHAZINE-DM) 6.25-15 MG/5ML syrup 5 mL  5 mL Per G Tube Q6H PRN Thaddeus Cavazos MD        mupirocin [facility]) 2 % ointment   Topical TID Thaddeus Cavazos MD        sennosides-docusate sodium (SENOKOT-S) 8.6-50 MG tablet 2 tablet  2 tablet Oral Daily Amparo Parkinson MD   2 tablet at 01/07/20 9709    polyethylene glycol (GLYCOLAX) packet 17 g  17 g Oral Daily Amparo Parkinson MD   17 g at 01/07/20 6518    ipratropium-albuterol (DUONEB) nebulizer solution 1 ampule  1 ampule Inhalation Q4H WA Thaddeus Cavazos MD   1 ampule at 01/07/20 1955     Allergies   Allergen Reactions    Seasonal Itching and Other (See Comments)     Eye drainage  Eye drainage  Nasal congestion.  Valium [Diazepam] Other (See Comments)     He received 1 mg and became obtunded and hypo-ventated, He would be better to use versed for spasticity. Per [employer]. Active Problems:    Pneumonia due to organism    Shortness of breath    Ileus ([address]  Resolved Problems:    * No resolved hospital problems. *    Blood pressure 119/73, pulse 112, temperature 98.4 °F (36.9 °C), temperature source Temporal, resp. rate 18, height 5' 4\" (1.626 m), weight 96 lb (43.5 kg), SpO2 93 %. Subjective:  Symptoms:  Stable. Diet:  Adequate intake. Activity level: Normal.    Pain:  He reports no pain. Objective:  General Appearance:  Comfortable. Vital signs: (most recent): Blood pressure 119/73, pulse 112, temperature 98.4 °F (36.9 °C), temperature source Temporal, resp. rate 18, height 5' 4\" (1.626 m), weight 96 lb (43.5 kg), SpO2 93 %. No fever. Lungs: There are decreased breath sounds. Assessment:  (URI/ possible pneumonia  Ileus  CP).      Plan:   (Surgery ok to resume diet. Tube feels per dietary, currently on  Hold. Surgery aware. Continue Abx. Monitor labs and cultures. Await placement. ).        Nita Arteaga MD  1/8/2020

## 2020-01-08 NOTE — PROGRESS NOTES
rounded/distended abd, contractures, dry mucous membranes, A&O x 3, non-verbal, PEG     · Wound Type: None     · Current Nutrition Therapies:  · Oral Diet Orders: NPO   · Oral Diet intake: NPO  · Oral Nutrition Supplement (ONS) Orders: None  · ONS intake: NPO     · Tube Feeding (TF) Orders:   · Feeding Route: Gastrostomy  · Formula: Semi-elemental  · Rate (Renny@[employer]    · Volume (ml/day): 1200ml   · Duration: Continuous  · Water Flushes: 120ml q 6 hours   · Current TF & Flush Orders Provides: 1200ml, 1440 calories, 90g protein, 973ml water, 1453ml total water      · Anthropometric Measures:  · Ht: 5' 4\" (162.6 cm)   · Current Body Wt: 106 lb (48.1 kg)(1/4/20, actual ; will not use 96# no method weight on 1/8/20 d/t possible wt discrepancy ; unable to obtain bedscale wt at this time)  · Admission Body Wt: 105 lb (47.6 kg)(1/3/20, bedscale)  · Usual Body Wt: 98 lb (44.5 kg)(9/2019 actual per EMR )  · Weight Change: EMR shows past weights of 98# actual on 9/2/19 and 91# no method on 7/11/19   · Ideal Body Wt: 130 lb (59 kg), % Ideal Body 82%  · BMI Classification: BMI <18.5 Underweight    Nutrition Interventions:   Continue NPO, Continue current Tube Feeding  Continued Inpatient Monitoring, Coordination of Care    Nutrition Evaluation:   · Evaluation: Progressing toward goals   · Goals: Tube Feeding will meet nutritional needs with good tolerance    · Monitoring: Nutrition Progression, TF Intake, TF Tolerance, Skin Integrity, I&O, Monitor Hemodynamic Status, Monitor Bowel Function, Mental Status/Confusion, Weight, Pertinent Labs, Chewing/Swallowing, Constipation, Nausea or Vomiting      Electronically signed by John Infante RD, LD on 1/8/20 at 2:56 PM    Contact Number: [phone]

## 2020-01-09 VITALS
RESPIRATION RATE: 16 BRPM | HEIGHT: 64 IN | BODY MASS INDEX: 16.39 KG/M2 | DIASTOLIC BLOOD PRESSURE: 76 MMHG | SYSTOLIC BLOOD PRESSURE: 110 MMHG | TEMPERATURE: 97.7 F | HEART RATE: 109 BPM | WEIGHT: 96 LBS | OXYGEN SATURATION: 96 %

## 2020-01-09 PROBLEM — J18.9 PNEUMONIA: Status: ACTIVE | Noted: 2020-01-09

## 2020-01-09 LAB
ANION GAP SERPL CALCULATED.3IONS-SCNC: 14 MMOL/L (ref 7–16)
BUN BLDV-MCNC: 10 MG/DL (ref 6–20)
CALCIUM SERPL-MCNC: 9.8 MG/DL (ref 8.6–10.2)
CHLORIDE BLD-SCNC: 100 MMOL/L (ref 98–107)
CO2: 25 MMOL/L (ref 22–29)
CREAT SERPL-MCNC: 0.3 MG/DL (ref 0.7–1.2)
GFR AFRICAN AMERICAN: >60
GFR NON-AFRICAN AMERICAN: >60 ML/MIN/1.73
GLUCOSE BLD-MCNC: 104 MG/DL (ref 74–99)
HCT VFR BLD CALC: 45.9 % (ref 37–54)
HEMOGLOBIN: 14.6 G/DL (ref 12.5–16.5)
MCH RBC QN AUTO: 30.8 PG (ref 26–35)
MCHC RBC AUTO-ENTMCNC: 31.8 % (ref 32–34.5)
MCV RBC AUTO: 96.8 FL (ref 80–99.9)
PDW BLD-RTO: 12.6 FL (ref 11.5–15)
PLATELET # BLD: 213 E9/L (ref 130–450)
PMV BLD AUTO: 10.9 FL (ref 7–12)
POTASSIUM SERPL-SCNC: 3.9 MMOL/L (ref 3.5–5)
RBC # BLD: 4.74 E12/L (ref 3.8–5.8)
SODIUM BLD-SCNC: 139 MMOL/L (ref 132–146)
WBC # BLD: 7.4 E9/L (ref 4.5–11.5)

## 2020-01-09 PROCEDURE — 6370000000 HC RX 637 (ALT 250 FOR IP): Performed by: FAMILY MEDICINE

## 2020-01-09 PROCEDURE — 6360000002 HC RX W HCPCS: Performed by: FAMILY MEDICINE

## 2020-01-09 PROCEDURE — 80048 BASIC METABOLIC PNL TOTAL CA: CPT

## 2020-01-09 PROCEDURE — 94640 AIRWAY INHALATION TREATMENT: CPT

## 2020-01-09 PROCEDURE — 36415 COLL VENOUS BLD VENIPUNCTURE: CPT

## 2020-01-09 PROCEDURE — 6370000000 HC RX 637 (ALT 250 FOR IP): Performed by: SURGERY

## 2020-01-09 PROCEDURE — 2500000003 HC RX 250 WO HCPCS: Performed by: FAMILY MEDICINE

## 2020-01-09 PROCEDURE — 2580000003 HC RX 258: Performed by: FAMILY MEDICINE

## 2020-01-09 PROCEDURE — 85027 COMPLETE CBC AUTOMATED: CPT

## 2020-01-09 PROCEDURE — 1200000000 HC SEMI PRIVATE

## 2020-01-09 RX ADMIN — LACTULOSE 20 G: 20 SOLUTION ORAL at 07:57

## 2020-01-09 RX ADMIN — ENOXAPARIN SODIUM 40 MG: 40 INJECTION SUBCUTANEOUS at 07:57

## 2020-01-09 RX ADMIN — Medication 40 MG: at 07:56

## 2020-01-09 RX ADMIN — METOCLOPRAMIDE HYDROCHLORIDE 5 MG: 5 SOLUTION ORAL at 13:55

## 2020-01-09 RX ADMIN — METOCLOPRAMIDE HYDROCHLORIDE 5 MG: 5 SOLUTION ORAL at 07:57

## 2020-01-09 RX ADMIN — IPRATROPIUM BROMIDE AND ALBUTEROL SULFATE 1 AMPULE: 2.5; .5 SOLUTION RESPIRATORY (INHALATION) at 11:46

## 2020-01-09 RX ADMIN — MUPIROCIN: 20 OINTMENT TOPICAL at 07:57

## 2020-01-09 RX ADMIN — SENNOSIDES AND DOCUSATE SODIUM 2 TABLET: 8.6; 5 TABLET ORAL at 07:56

## 2020-01-09 RX ADMIN — CEFTRIAXONE SODIUM 1 G: 1 INJECTION, POWDER, FOR SOLUTION INTRAMUSCULAR; INTRAVENOUS at 02:27

## 2020-01-09 RX ADMIN — CLOTRIMAZOLE AND BETAMETHASONE DIPROPIONATE: 10; .5 CREAM TOPICAL at 07:58

## 2020-01-09 RX ADMIN — IPRATROPIUM BROMIDE AND ALBUTEROL SULFATE 1 AMPULE: 2.5; .5 SOLUTION RESPIRATORY (INHALATION) at 09:02

## 2020-01-09 RX ADMIN — DOXYCYCLINE 100 MG: 100 INJECTION, POWDER, LYOPHILIZED, FOR SOLUTION INTRAVENOUS at 02:33

## 2020-01-09 RX ADMIN — POLYETHYLENE GLYCOL 3350 17 G: 17 POWDER, FOR SOLUTION ORAL at 07:57

## 2020-01-09 RX ADMIN — MUPIROCIN: 20 OINTMENT TOPICAL at 13:55

## 2020-01-09 ASSESSMENT — PAIN SCALES - GENERAL
PAINLEVEL_OUTOF10: 0
PAINLEVEL_OUTOF10: 0

## 2020-01-09 NOTE — CARE COORDINATION
[facility], completed LOC and faxed to area agency[facility], await LOC. Spoke with Lucy Garza ([facility]) agreeable for 2pm , [facility] ambulance 2pm . Left voice mail for legal guardian of discharge and transfer time. [facility]

## 2020-01-09 NOTE — PROGRESS NOTES
IV syringe  1 g Intravenous Q24H Mj Ward MD   1 g at 01/09/20 0227    promethazine-dextromethorphan (PROMETHAZINE-DM) 6.25-15 MG/5ML syrup 5 mL  5 mL Per G Tube Q6H PRN Mj Ward MD        mupirocin [facility]) 2 % ointment   Topical TID Mj Ward MD        sennosides-docusate sodium (SENOKOT-S) 8.6-50 MG tablet 2 tablet  2 tablet Oral Daily Tao Rm MD   2 tablet at 01/08/20 9081    polyethylene glycol (GLYCOLAX) packet 17 g  17 g Oral Daily Tao Rm MD   17 g at 01/08/20 9099    ipratropium-albuterol (DUONEB) nebulizer solution 1 ampule  1 ampule Inhalation Q4H WA Mj Ward MD   1 ampule at 01/08/20 2300     Allergies   Allergen Reactions    Seasonal Itching and Other (See Comments)     Eye drainage  Eye drainage  Nasal congestion.  Valium [Diazepam] Other (See Comments)     He received 1 mg and became obtunded and hypo-ventated, He would be better to use versed for spasticity. Per [employer]. Active Problems:    Pneumonia due to organism    Shortness of breath    Ileus ([address]  Resolved Problems:    * No resolved hospital problems. *    Blood pressure 125/73, pulse 106, temperature 98.5 °F (36.9 °C), temperature source Temporal, resp. rate 19, height 5' 4\" (1.626 m), weight 96 lb (43.5 kg), SpO2 95 %. Subjective:  Symptoms:  Stable. Diet:  Adequate intake. Activity level: Normal.    Pain:  He reports no pain. Objective:  General Appearance:  Comfortable. Vital signs: (most recent): Blood pressure 125/73, pulse 106, temperature 98.5 °F (36.9 °C), temperature source Temporal, resp. rate 19, height 5' 4\" (1.626 m), weight 96 lb (43.5 kg), SpO2 95 %. No fever. Lungs: There are decreased breath sounds. Assessment:  (URI/ possible pneumonia  Ileus  CP). Plan:   (Surgery ok to resume diet. Tube feels per dietary, currently on  Hold. Surgery aware. Continue Abx. Monitor labs and cultures. Await placement. ).

## 2020-01-09 NOTE — CARE COORDINATION
Spoke with Ramonita Coon ([facility]) who needs discharge order to complete submission for PASSR. If not approved Ramonita Coon is currently working on alternative placement since group home [facility] declined to take pt back because they can not do continuous tube feeds. Nenita FERGUSON

## 2020-11-03 PROBLEM — J18.9 PNEUMONIA DUE TO ORGANISM: Status: RESOLVED | Noted: 2020-01-03 | Resolved: 2020-11-03

## 2022-11-18 ENCOUNTER — TELEPHONE (OUTPATIENT)
Dept: ADMINISTRATIVE | Age: 22
End: 2022-11-18

## 2022-11-18 NOTE — TELEPHONE ENCOUNTER
Patricia calling [facility] per Dr. Prakash Up to schedule NP with Cardiology    Patient Appointment Form:      PCP: Dr. Prakash Up  Referring: Dr. Prakash Up    Has the Patient:    Seen a Cardiologist? no    Had a heart catheterization? no    Had heart surgery? no    Had a stress test or nuclear stress test? no    Had an echocardiogram? Yes Rossy pena     Had a vascular ultrasound? no    Had a 24/48 heart monitor or extended cardiac event monitor? no    Had recent blood work in the last 6 months?  Yes Faxing to [facility]    Had a pacemaker/ICD/ILR implant? no    Seen an Electrophysiologist? no        Will send records via: [facility] faxing referral/OV/Labs and echo to [facility] office - Dr. Prakash Up      Date & time of appointment:  11/30/22 @ 11:20 with Dr. Hunter Gregory

## 2022-11-30 ENCOUNTER — OFFICE VISIT (OUTPATIENT)
Dept: CARDIOLOGY CLINIC | Age: 22
End: 2022-11-30
Payer: COMMERCIAL

## 2022-11-30 VITALS
SYSTOLIC BLOOD PRESSURE: 104 MMHG | BODY MASS INDEX: 17.85 KG/M2 | WEIGHT: 104 LBS | HEART RATE: 98 BPM | DIASTOLIC BLOOD PRESSURE: 64 MMHG | RESPIRATION RATE: 16 BRPM

## 2022-11-30 DIAGNOSIS — I05.8 MITRAL VALVE MASS: ICD-10-CM

## 2022-11-30 DIAGNOSIS — M86.9 OSTEOMYELITIS, UNSPECIFIED SITE, UNSPECIFIED TYPE (HCC): ICD-10-CM

## 2022-11-30 DIAGNOSIS — R93.1 ABNORMAL ECHOCARDIOGRAM: Primary | ICD-10-CM

## 2022-11-30 PROBLEM — R06.02 SHORTNESS OF BREATH: Status: RESOLVED | Noted: 2020-01-03 | Resolved: 2022-11-30

## 2022-11-30 PROBLEM — G80.8 OTHER CEREBRAL PALSY (HCC): Status: ACTIVE | Noted: 2022-11-30

## 2022-11-30 PROBLEM — J18.9 PNEUMONIA: Status: RESOLVED | Noted: 2020-01-09 | Resolved: 2022-11-30

## 2022-11-30 PROCEDURE — 99204 OFFICE O/P NEW MOD 45 MIN: CPT | Performed by: INTERNAL MEDICINE

## 2022-11-30 PROCEDURE — G8427 DOCREV CUR MEDS BY ELIG CLIN: HCPCS | Performed by: INTERNAL MEDICINE

## 2022-11-30 PROCEDURE — G8484 FLU IMMUNIZE NO ADMIN: HCPCS | Performed by: INTERNAL MEDICINE

## 2022-11-30 PROCEDURE — 1036F TOBACCO NON-USER: CPT | Performed by: INTERNAL MEDICINE

## 2022-11-30 PROCEDURE — 93000 ELECTROCARDIOGRAM COMPLETE: CPT | Performed by: INTERNAL MEDICINE

## 2022-11-30 PROCEDURE — G8419 CALC BMI OUT NRM PARAM NOF/U: HCPCS | Performed by: INTERNAL MEDICINE

## 2022-11-30 RX ORDER — FAMOTIDINE 20 MG/1
20 TABLET, FILM COATED ORAL
COMMUNITY

## 2022-11-30 RX ORDER — ALBUTEROL SULFATE 2.5 MG/.5ML
2.5 SOLUTION RESPIRATORY (INHALATION) EVERY 6 HOURS PRN
COMMUNITY

## 2022-11-30 RX ORDER — OLOPATADINE HYDROCHLORIDE 1 MG/ML
1 SOLUTION/ DROPS OPHTHALMIC
COMMUNITY

## 2022-11-30 RX ORDER — LEVETIRACETAM 500 MG/1
500 TABLET ORAL 2 TIMES DAILY
COMMUNITY

## 2022-11-30 RX ORDER — L.ACIDOPH/B.ANIMALIS/B.LONGUM 15B CELL
CAPSULE ORAL
COMMUNITY

## 2022-11-30 RX ORDER — PRUCALOPRIDE 2 MG/1
2 TABLET, FILM COATED ORAL
COMMUNITY

## 2022-11-30 RX ORDER — FEXOFENADINE HCL 180 MG/1
180 TABLET ORAL DAILY
COMMUNITY

## 2022-11-30 RX ORDER — POLYETHYLENE GLYCOL 3350 17 G/17G
17 POWDER, FOR SOLUTION ORAL
COMMUNITY

## 2022-11-30 NOTE — PROGRESS NOTES
Chief Complaint   Patient presents with    Abnormal Test Results       Patient Active Problem List    Diagnosis Date Noted    Osteomyelitis ([facility] [address] 11/30/2022     Priority: Medium    Other cerebral palsy ([facility] 11/30/2022     Priority: Medium       Current Outpatient Medications   Medication Sig Dispense Refill    metoprolol tartrate (LOPRESSOR) 25 MG tablet Take 25 mg by mouth 2 times daily      albuterol (PROVENTIL) 2.5 MG/0.5ML NEBU nebulizer solution Take 2.5 mg by nebulization every 6 hours as needed for Wheezing      levETIRAcetam (KEPPRA) 500 MG tablet 500 mg 2 times daily      LANSOPRAZOLE PO 30 mg by PEG Tube route      Prucalopride Succinate (MOTEGRITY) 2 MG TABS 2 mg by PEG Tube route      AMINO ACIDS-PROTEIN HYDROLYS PO by PEG Tube route      Multiple Vitamin (MULTI-DAY VITAMINS PO) by PEG Tube route      famotidine (PEPCID) 20 MG tablet 20 mg by PEG Tube route      polyethylene glycol (GLYCOLAX) 17 g packet 17 g by Per G Tube route      fexofenadine (ALLEGRA) 180 MG tablet 180 mg by PEG Tube route daily      olopatadine (PATANOL) 0.1 % ophthalmic solution Place 1 drop into both eyes      Probiotic Product [facility]) CAPS by PEG Tube route      lactulose (CHRONULAC) 10 GM/15ML solution Take 30 mLs by mouth 3 times daily 1 Bottle 1    sennosides-docusate sodium (SENOKOT-S) 8.6-50 MG tablet Take 2 tablets by mouth daily 60 tablet 1    guaiFENesin (ROBITUSSIN) 100 MG/5ML SOLN oral solution Take 100 mg by mouth every 4 hours as needed for Cough Peg tube      clotrimazole-betamethasone (LOTRISONE) 1-0.05 % cream Apply topically 2 times daily 1 Tube 0    BACLOFEN IT by Intrathecal route continuous      zinc oxide (PINXAV) 30 % OINT Apply topically as needed      Acetaminophen 160 MG/5ML SYRP 20 mLs by PEG Tube route every 4 hours as needed for Fever For pain, headache or fever      ibuprofen (ADVIL;MOTRIN) 100 MG/5ML suspension 10 mg/kg by Per G Tube route every 4 hours as needed for Fever      loperamide (IMODIUM) 1 MG/7.5ML LIQD solution Take 15 mLs by mouth as needed After second and third loose stool      Magnesium Hydroxide (MILK OF MAGNESIA PO) 30 mLs by Per G Tube route as needed If no BM in 3 days      bismuth subsalicylate (PEPTO BISMOL) 262 MG/15ML suspension 15 mLs by Per G Tube route every 4 hours as needed for Indigestion      fluticasone (FLONASE) 50 MCG/ACT nasal spray 1 spray by Each Nare route as needed for Rhinitis      OnabotulinumtoxinA (BOTOX IJ) Inject as directed every 3 months For muscle spasms (Patient not taking: Reported on 11/30/2022)      metoclopramide (REGLAN) 5 MG/5ML solution 5 mg by PEG Tube route 3 times daily       ranitidine (ZANTAC) 150 MG/10ML syrup by Per G Tube route 2 times daily       Current Facility-Administered Medications   Medication Dose Route Frequency Provider Last Rate Last Admin    perflutren lipid microspheres (DEFINITY) injection 1.5 mL  1.5 mL IntraVENous ONCE PRN Bismark Shanks MD            Allergies   Allergen Reactions    Seasonal Itching and Other (See Comments)     Eye drainage  Eye drainage  Nasal congestion. Valium [Diazepam] Other (See Comments)     He received 1 mg and became obtunded and hypo-ventated, He would be better to use versed for spasticity. Per Jed Dominguez. 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 Went to [facility] for unclear reasons, and echo obtained, again for unclear reasons - report reviewed. Guardian is not aware of recent fevers, embolic phenomena. .          Physical Exam   /64   Pulse 98   Resp 16   Wt 104 lb (47.2 kg)   BMI 17.85 kg/m²   Constitutional: non verbal, cerebral palsy with contracturesnt. Carotid bruit is not present.    Cardiovascular: Normal rate, regular rhythm, normal heart sounds    No murmur heard. Pulmonary/Chest: Effort normal and breath sounds normal.   Abdominal: Soft. Bowel sounds are normal  Musculoskeletal: No edema   Skin: Skin is warm and dry. No rash noted. Psychiatric: Normal mood and affect. Behavior is normal.     EKG:  normal sinus rhythm, RSR', there are no previous tracings available for comparison.     ASSESSMENT AND PLAN:  Patient Active Problem List   Diagnosis    Osteomyelitis ([address]    Other cerebral palsy ([address]     Portable echo done at [facility] for unclear reasons - not signed by physician - study conclusions range from artifact to life threatening diagnoses  Discussed concept of [facility]SHA[facility] vs repeat TTE at our facility and patients guardian prefers latter      Aubrey Veliz M.D  [facility]

## 2023-10-03 ENCOUNTER — TELEPHONE (OUTPATIENT)
Dept: NON INVASIVE DIAGNOSTICS | Age: 23
End: 2023-10-03

## 2023-10-03 NOTE — TELEPHONE ENCOUNTER
Called patient's Legal Guardian/POA re:pending echo order. States she believes testing was performed at [facility] and was not needed to be scheduled.

## 2025-05-01 DIAGNOSIS — R94.31 ABNORMAL ELECTROCARDIOGRAPHY: Primary | ICD-10-CM

## 2025-05-05 ENCOUNTER — TELEPHONE (OUTPATIENT)
Dept: CARDIOLOGY | Age: 25
End: 2025-05-05

## 2025-05-05 NOTE — TELEPHONE ENCOUNTER
CALLED PATIENTS LEGAL GUARDIAN, [employer], ABOUT SCHEDULE ECHO AND O/V WITH DR. BLUM ON THURSDAY MAY 22 AT 9 AND LEFT A MESSAGE CONFIRMING THOSE DETAILS AND TO CALL BACK WITH ANY QUESTIONS OR CONCERNS.    I ALSO CALLED AND SPOKE WITH BENJAMIN ON THE THIRD FLOOR AT [facility] IN [place] AND VERBALIZED THOSE SAME DETAILS WITH HER.    Electronically signed by Viviane Hi on 5/5/2025 at 8:41 AM

## 2025-05-19 ENCOUNTER — TELEPHONE (OUTPATIENT)
Dept: CARDIOLOGY | Age: 25
End: 2025-05-19

## 2025-05-19 NOTE — TELEPHONE ENCOUNTER
Spoke with patient's guardian, Elinor, regarding denial of ECHO.  Advised that patient is to keep appointment with Dr. Smart on Thursday at 10:00 and then we will proceed from there.  Elinor verbalized understanding and in agreement.

## 2025-05-19 NOTE — TELEPHONE ENCOUNTER
Echocardiogram scheduled for same day as office visit (5/22/25) was DENIED by insurance.    We can try to resubmit or appeal decision after visit if you would like.  Please advise.

## 2025-05-22 ENCOUNTER — OFFICE VISIT (OUTPATIENT)
Dept: CARDIOLOGY CLINIC | Age: 25
End: 2025-05-22
Payer: COMMERCIAL

## 2025-05-22 VITALS
SYSTOLIC BLOOD PRESSURE: 113 MMHG | WEIGHT: 96 LBS | HEART RATE: 72 BPM | OXYGEN SATURATION: 97 % | HEIGHT: 64 IN | RESPIRATION RATE: 18 BRPM | BODY MASS INDEX: 16.39 KG/M2 | TEMPERATURE: 97.9 F | DIASTOLIC BLOOD PRESSURE: 76 MMHG

## 2025-05-22 DIAGNOSIS — R94.31 ABNORMAL ELECTROCARDIOGRAM (ECG) (EKG): ICD-10-CM

## 2025-05-22 DIAGNOSIS — I50.9 CONGESTIVE HEART FAILURE, UNSPECIFIED HF CHRONICITY, UNSPECIFIED HEART FAILURE TYPE (HCC): ICD-10-CM

## 2025-05-22 DIAGNOSIS — I51.9 HEART PROBLEM: Primary | ICD-10-CM

## 2025-05-22 PROCEDURE — G8419 CALC BMI OUT NRM PARAM NOF/U: HCPCS | Performed by: INTERNAL MEDICINE

## 2025-05-22 PROCEDURE — 99214 OFFICE O/P EST MOD 30 MIN: CPT | Performed by: INTERNAL MEDICINE

## 2025-05-22 PROCEDURE — 93000 ELECTROCARDIOGRAM COMPLETE: CPT | Performed by: INTERNAL MEDICINE

## 2025-05-22 PROCEDURE — G8427 DOCREV CUR MEDS BY ELIG CLIN: HCPCS | Performed by: INTERNAL MEDICINE

## 2025-05-22 PROCEDURE — 1036F TOBACCO NON-USER: CPT | Performed by: INTERNAL MEDICINE

## 2025-05-22 RX ORDER — BUSPIRONE HYDROCHLORIDE 5 MG/1
5 TABLET ORAL 3 TIMES DAILY
COMMUNITY

## 2025-05-22 RX ORDER — ONDANSETRON 4 MG/1
4 TABLET, FILM COATED ORAL EVERY 8 HOURS PRN
COMMUNITY

## 2025-05-22 NOTE — PROGRESS NOTES
Chief Complaint   Patient presents with    Heart Problem       Patient Active Problem List    Diagnosis Date Noted    Osteomyelitis ([facility]) 11/30/2022    Other cerebral palsy ([facility]) 11/30/2022       Current Outpatient Medications   Medication Sig Dispense Refill    busPIRone (BUSPAR) 5 MG tablet Take 1 tablet by mouth 3 times daily      ondansetron (ZOFRAN) 4 MG tablet Take 1 tablet by mouth every 8 hours as needed for Nausea or Vomiting      metoprolol tartrate (LOPRESSOR) 25 MG tablet Take 1 tablet by mouth 2 times daily      albuterol (PROVENTIL) 2.5 MG/0.5ML NEBU nebulizer solution Take 0.5 mLs by nebulization every 6 hours as needed for Wheezing      levETIRAcetam (KEPPRA) 500 MG tablet 1 tablet 2 times daily      AMINO ACIDS-PROTEIN HYDROLYS PO by PEG Tube route      Multiple Vitamin (MULTI-DAY VITAMINS PO) by PEG Tube route      famotidine (PEPCID) 20 MG tablet 1 tablet by PEG Tube route      sennosides-docusate sodium (SENOKOT-S) 8.6-50 MG tablet Take 2 tablets by mouth daily 60 tablet 1    Acetaminophen 160 MG/5ML SYRP 20 mLs by PEG Tube route every 4 hours as needed for Fever For pain, headache or fever      ibuprofen (ADVIL;MOTRIN) 100 MG/5ML suspension 10 mg/kg by Per G Tube route every 4 hours as needed for Fever      fluticasone (FLONASE) 50 MCG/ACT nasal spray 1 spray by Each Nostril route as needed for Rhinitis      LANSOPRAZOLE PO 30 mg by PEG Tube route      Prucalopride Succinate (MOTEGRITY) 2 MG TABS 1 tablet by PEG Tube route      polyethylene glycol (GLYCOLAX) 17 g packet 1 packet by Per G Tube route      fexofenadine (ALLEGRA) 180 MG tablet 180 mg by PEG Tube route daily      olopatadine (PATANOL) 0.1 % ophthalmic solution Place 1 drop into both eyes      Probiotic Product (FLORAJEN3) CAPS by PEG Tube route      lactulose (CHRONULAC) 10 GM/15ML solution Take 30 mLs by mouth 3 times daily 1 Bottle 1    guaiFENesin (ROBITUSSIN) 100 MG/5ML SOLN oral solution Take 100 mg by mouth every 4 hours as

## 2025-07-14 ENCOUNTER — TELEPHONE (OUTPATIENT)
Dept: CARDIOLOGY | Age: 25
End: 2025-07-14

## 2025-07-14 NOTE — TELEPHONE ENCOUNTER
Left message for Legal Guardian, Elinor, to notify her that we had to push back patient's ECHO appointment due to not being able to get Authorization from insurance yet.  We will resubmit the case this week.  Asked her to call back so we can schedule him couple weeks out while we work on the authorization.

## 2025-07-16 ENCOUNTER — TELEPHONE (OUTPATIENT)
Dept: CARDIOLOGY | Age: 25
End: 2025-07-16

## 2025-07-16 NOTE — TELEPHONE ENCOUNTER
Left message for patient's guardian, Elinor, to notify her that we were canceling patient's Echo tomorrow 7/17 due to no insurance authorization.  Advised her to call back to reschedule testing at her convenience